# Patient Record
Sex: FEMALE | Race: BLACK OR AFRICAN AMERICAN | NOT HISPANIC OR LATINO | Employment: OTHER | URBAN - METROPOLITAN AREA
[De-identification: names, ages, dates, MRNs, and addresses within clinical notes are randomized per-mention and may not be internally consistent; named-entity substitution may affect disease eponyms.]

---

## 2001-11-29 LAB — BMD RECOMMENDATION EXT: NORMAL

## 2020-10-05 ENCOUNTER — HOSPITAL ENCOUNTER (OUTPATIENT)
Dept: MEDSURG UNIT | Facility: HOSPITAL | Age: 75
End: 2020-10-07
Attending: INTERNAL MEDICINE | Admitting: INTERNAL MEDICINE

## 2020-10-05 LAB
ABS NEUT (OLG): 9.03 X10(3)/MCL (ref 2.1–9.2)
ALBUMIN SERPL-MCNC: 4 GM/DL (ref 3.4–4.8)
ALBUMIN/GLOB SERPL: 0.9 RATIO (ref 1.1–2)
ALP SERPL-CCNC: 130 UNIT/L (ref 40–150)
ALT SERPL-CCNC: 20 UNIT/L (ref 0–55)
AST SERPL-CCNC: 24 UNIT/L (ref 5–34)
BASOPHILS # BLD AUTO: 0.1 X10(3)/MCL (ref 0–0.2)
BASOPHILS NFR BLD AUTO: 1 %
BILIRUB SERPL-MCNC: 0.3 MG/DL
BILIRUBIN DIRECT+TOT PNL SERPL-MCNC: 0.1 MG/DL (ref 0–0.5)
BILIRUBIN DIRECT+TOT PNL SERPL-MCNC: 0.2 MG/DL (ref 0–0.8)
BUN SERPL-MCNC: 8.7 MG/DL (ref 9.8–20.1)
CALCIUM SERPL-MCNC: 8.9 MG/DL (ref 8.4–10.2)
CHLORIDE SERPL-SCNC: 101 MMOL/L (ref 98–107)
CO2 SERPL-SCNC: 28 MMOL/L (ref 23–31)
CREAT SERPL-MCNC: 0.77 MG/DL (ref 0.55–1.02)
EOSINOPHIL # BLD AUTO: 0.2 X10(3)/MCL (ref 0–0.9)
EOSINOPHIL NFR BLD AUTO: 1 %
ERYTHROCYTE [DISTWIDTH] IN BLOOD BY AUTOMATED COUNT: 14.6 % (ref 11.5–17)
GLOBULIN SER-MCNC: 4.3 GM/DL (ref 2.4–3.5)
GLUCOSE SERPL-MCNC: 107 MG/DL (ref 82–115)
HCT VFR BLD AUTO: 40 % (ref 37–47)
HGB BLD-MCNC: 12.3 GM/DL (ref 12–16)
LYMPHOCYTES # BLD AUTO: 1.9 X10(3)/MCL (ref 0.6–4.6)
LYMPHOCYTES NFR BLD AUTO: 16 %
MCH RBC QN AUTO: 27.3 PG (ref 27–31)
MCHC RBC AUTO-ENTMCNC: 30.8 GM/DL (ref 33–36)
MCV RBC AUTO: 88.9 FL (ref 80–94)
MONOCYTES # BLD AUTO: 1.2 X10(3)/MCL (ref 0.1–1.3)
MONOCYTES NFR BLD AUTO: 9 %
NEUTROPHILS # BLD AUTO: 9.03 X10(3)/MCL (ref 2.1–9.2)
NEUTROPHILS NFR BLD AUTO: 73 %
PLATELET # BLD AUTO: 447 X10(3)/MCL (ref 130–400)
PMV BLD AUTO: 8.8 FL (ref 9.4–12.4)
POTASSIUM SERPL-SCNC: 4 MMOL/L (ref 3.5–5.1)
PROT SERPL-MCNC: 8.3 GM/DL (ref 5.8–7.6)
RBC # BLD AUTO: 4.5 X10(6)/MCL (ref 4.2–5.4)
SODIUM SERPL-SCNC: 139 MMOL/L (ref 136–145)
WBC # SPEC AUTO: 12.4 X10(3)/MCL (ref 4.5–11.5)

## 2020-10-06 LAB
ABS NEUT (OLG): 6.81 X10(3)/MCL (ref 2.1–9.2)
BASOPHILS # BLD AUTO: 0 X10(3)/MCL (ref 0–0.2)
BASOPHILS NFR BLD AUTO: 0 %
BUN SERPL-MCNC: 11.9 MG/DL (ref 9.8–20.1)
CALCIUM SERPL-MCNC: 8.5 MG/DL (ref 8.4–10.2)
CHLORIDE SERPL-SCNC: 106 MMOL/L (ref 98–107)
CO2 SERPL-SCNC: 22 MMOL/L (ref 23–31)
CREAT SERPL-MCNC: 0.71 MG/DL (ref 0.55–1.02)
CREAT/UREA NIT SERPL: 17
EOSINOPHIL # BLD AUTO: 0.2 X10(3)/MCL (ref 0–0.9)
EOSINOPHIL NFR BLD AUTO: 2 %
ERYTHROCYTE [DISTWIDTH] IN BLOOD BY AUTOMATED COUNT: 14.6 % (ref 11.5–17)
GLUCOSE SERPL-MCNC: 104 MG/DL (ref 82–115)
HCT VFR BLD AUTO: 36.7 % (ref 37–47)
HGB BLD-MCNC: 11.2 GM/DL (ref 12–16)
LYMPHOCYTES # BLD AUTO: 2 X10(3)/MCL (ref 0.6–4.6)
LYMPHOCYTES NFR BLD AUTO: 20 %
MCH RBC QN AUTO: 27.9 PG (ref 27–31)
MCHC RBC AUTO-ENTMCNC: 30.5 GM/DL (ref 33–36)
MCV RBC AUTO: 91.3 FL (ref 80–94)
MONOCYTES # BLD AUTO: 1.1 X10(3)/MCL (ref 0.1–1.3)
MONOCYTES NFR BLD AUTO: 11 %
NEUTROPHILS # BLD AUTO: 6.81 X10(3)/MCL (ref 2.1–9.2)
NEUTROPHILS NFR BLD AUTO: 66 %
PLATELET # BLD AUTO: 462 X10(3)/MCL (ref 130–400)
PMV BLD AUTO: 9.5 FL (ref 9.4–12.4)
POTASSIUM SERPL-SCNC: 3.8 MMOL/L (ref 3.5–5.1)
RBC # BLD AUTO: 4.02 X10(6)/MCL (ref 4.2–5.4)
SODIUM SERPL-SCNC: 138 MMOL/L (ref 136–145)
WBC # SPEC AUTO: 10.3 X10(3)/MCL (ref 4.5–11.5)

## 2020-10-07 LAB
ABS NEUT (OLG): 3.93 X10(3)/MCL (ref 2.1–9.2)
BASOPHILS # BLD AUTO: 0 X10(3)/MCL (ref 0–0.2)
BASOPHILS NFR BLD AUTO: 0 %
BUN SERPL-MCNC: 12 MG/DL (ref 9.8–20.1)
CALCIUM SERPL-MCNC: 8.1 MG/DL (ref 8.4–10.2)
CHLORIDE SERPL-SCNC: 101 MMOL/L (ref 98–107)
CO2 SERPL-SCNC: 28 MMOL/L (ref 23–31)
CREAT SERPL-MCNC: 0.75 MG/DL (ref 0.55–1.02)
CREAT/UREA NIT SERPL: 16
EOSINOPHIL # BLD AUTO: 0.4 X10(3)/MCL (ref 0–0.9)
EOSINOPHIL NFR BLD AUTO: 5 %
ERYTHROCYTE [DISTWIDTH] IN BLOOD BY AUTOMATED COUNT: 14.6 % (ref 11.5–17)
GLUCOSE SERPL-MCNC: 103 MG/DL (ref 82–115)
HCT VFR BLD AUTO: 34.5 % (ref 37–47)
HGB BLD-MCNC: 10.2 GM/DL (ref 12–16)
LYMPHOCYTES # BLD AUTO: 2.4 X10(3)/MCL (ref 0.6–4.6)
LYMPHOCYTES NFR BLD AUTO: 31 %
MCH RBC QN AUTO: 27.1 PG (ref 27–31)
MCHC RBC AUTO-ENTMCNC: 29.6 GM/DL (ref 33–36)
MCV RBC AUTO: 91.8 FL (ref 80–94)
MONOCYTES # BLD AUTO: 1 X10(3)/MCL (ref 0.1–1.3)
MONOCYTES NFR BLD AUTO: 13 %
NEUTROPHILS # BLD AUTO: 3.93 X10(3)/MCL (ref 2.1–9.2)
NEUTROPHILS NFR BLD AUTO: 50 %
PLATELET # BLD AUTO: 356 X10(3)/MCL (ref 130–400)
PMV BLD AUTO: 8.7 FL (ref 9.4–12.4)
POTASSIUM SERPL-SCNC: 3.7 MMOL/L (ref 3.5–5.1)
RBC # BLD AUTO: 3.76 X10(6)/MCL (ref 4.2–5.4)
SODIUM SERPL-SCNC: 136 MMOL/L (ref 136–145)
WBC # SPEC AUTO: 7.9 X10(3)/MCL (ref 4.5–11.5)

## 2022-04-30 NOTE — DISCHARGE SUMMARY
DISCHARGE DATE:  10/07/2020    FINAL DIAGNOSES:    1. Right breast mass.  2. __________ carcinoma.  3. Hypertension.  4. Hyperlipidemia.  5. Sinusitis.    HOSPITAL COURSE:  The patient is a 75-year-old  female who stated that she has been showing some blood clot and some purulent material from the right breast for almost about 2 weeks, but it appeared that she had the problem before that, but she never basically addressed the problem and, prior to that, the patient basically did not want to have a mammogram.  However, when I saw the patient in my office, I admitted the patient immediately and consulted Surgery __________ who eventually consulted another physician who would deal with the breast.  Apparently, we all agreed that when the breast surgery physician also agreed that the patient should have a mammogram and a biopsy, but apparently the patient did not want to have that here, and she wanted to go to [facility] in [place].  I immediately arranged with the  to get in touch with [facility], and apparently the patient is accepted over there, and she is going to go tomorrow.  I definitely had insisted that the breast surgery physician talk to the patient,  __________, and he did and offered the patient the options in case it does not work out over there so we can follow up on the patient closely.    FINAL DISPOSITION:  We will discharge the patient to home and with the indication to follow her appointment at [facility], and also I would like to have contract with the patient in the next 2 weeks to make sure everything goes well.  __________ to get in touch with my office in 2 weeks.  Discharged in satisfactory physical condition and hopefully everything will work out well for her.        ______________________________  MD EARL Longoria/SK  DD:  10/07/2020  Time:  01:01PM  DT:  10/07/2020  Time:  02:07PM  Job #:  [id number]

## 2022-04-30 NOTE — H&P
CHIEF COMPLAINT:  Right breast mass, abscess, and clot.    HISTORY OF PRESENT ILLNESS:  The patient is a 75-year-old  female who basically came to my office because of right breast pain and complaining of clot and claimed also that there was some purulent material that came out.  At this point, I reviewed the area and thought the patient was in need of admission.  I direct admitted the patient to [facility] and consulted Surgery to __________ the breast.    PAST MEDICAL HISTORY:  Significant for hypertension, hyperlipidemia, sinusitis.    PAST SURGICAL HISTORY:  Hysterectomy, angiogram, left cyst removal of the neck.    FAMILY HISTORY:  Cancer, diabetes, __________, stroke, and hypertension.    SOCIAL HISTORY:  The patient is .  Completed 2-1/2 years of college.  Does not drink or smoke.  Has 3 children.  No history of IV drug abuse.    ALLERGIES:  She is allergic to cortisone, penicillin, sulfa.    MEDICATIONS:  The patient takes:  1. Lotrel 10/20 mg daily.  2. Aspirin 81 mg.  3. Occasionally takes tramadol 50 mg b.i.d.  4. Prilosec 40 mg once a day.    REVIEW OF SYSTEMS:  CARDIOVASCULAR:  Negative for chest pain.   RESPIRATORY:  No shortness of breath or productive cough.   GASTROINTESTINAL:  No diarrhea or vomiting.   ENDOCRINOLOGIC:  __________.   NEUROLOGIC:  No neurological deficit.     PSYCHIATRIC:  Negative.    PHYSICAL EXAMINATION:  VITAL SIGNS:  At the time I am seeing the patient, she has a blood pressure of 153/76, a pulse 77, respirations 18, temperature 36.6.   HEAD:  Normocephalic.  No acute trauma to the head.   EYES:  Both eyes are equal and reactive to light, and accommodation is fairly satisfactory.   EARS, NOSE, AND THROAT:  No current pathology.   NECK:  Supple.  No JVD or adenopathy.   HEART:  The patient has S1, S2.  No murmur or gallop.   CHEST:  Clear.  No wheezing or rales.  However, the patient has a right breast mass and apparently some  oozing from the breast and clot.   ABDOMEN:  Soft, nontender.  Good bowel sounds.   EXTREMITIES:  Superior and inferior, good range of motion.   NEUROLOGIC:  No neurological deficit.  Cranial nerves 2 through 12 appear to be intact.    LABORATORY DATA:  The patient has an elevated white blood cell count of 12.4.  BUN 8.7, creatinine 0.77.  Hemoglobin 12.3, hematocrit 40.    IMPRESSION:  Right breast abscess/right breast mass.    PLAN:  To consult Surgery to see what the next step is for this patient.        ______________________________  MD EARL Longoria/SK  DD:  10/06/2020  Time:  02:33PM  DT:  10/06/2020  Time:  03:14PM  Job #:  [id number]    The H&P was reviewed, the patient was examined, and the following changes to the patients condition are noted:  ______________________________________________________________________________  ______________________________________________________________________________  ______________________________________________________________________________  [  ] No changes to the patient's condition:      ______________________________                                             ___________________  PHYSICIAN SIGNATURE                                                             DATE/TIME

## 2022-05-05 NOTE — HISTORICAL [facility] CERNER
This is a historical note converted from Cerjade. Formatting and pictures may have been removed.  Please reference Kaht for original formatting and attached multimedia. Chief Complaint  direct admit for Dr. Pickering for right breast abscess  Reason for Consultation  fungating mass that is likely cancerous  History of Present Illness  Ms Milla Rider is a 74 y/o female patient who was admitted for a right breast mass with ulceration and drainage. She first noticed a skin lesion in the 2:00 area about a year ago. The area gradually enlarged and spread laterally?since she first noticed it. She said a few months ago it began to bruise, and then it ulcerated about 2 weeks ago. She was dressing the area with gauze but decided to?see her PCP yesterday when it began profusely bleeding. After seeing him, he directly admitted her to [facility] and surgery was consulted. The right breast is very tender to touch, especially in the ulceration. She denies any other associated symptoms such as fever, chills, weight loss, or masses in the other breast.  ?   She has not had any?recent mammograms. Has had at least one before but not sure how long ago.  She denies family history of breast cancer. Her mother had lung cancer as well as her grandfather. Denies family history of colon, ovarian, pancreatic, or stomach cancer.  Review of Systems  Constitutional: denies fevers, chills, weight loss  HEENT: denies blurry/double vision, changes in hearing, odynophagia, dysphagia  Respiratory: denies cough, shortness of breath  Cardiovascular: denies palpitations, swelling of the extremities  GI: denies abdominal pain, nausea/vomiting, hematochezia, frequent stools  : denies frequency, dysuria, flank pain, hematuria  Skin: denies new rashes  Neurological: denies muscular/sensory deficiencies, loss of coordination, headaches, memory changes  Endo: denies hair loss/thinning, nervousness, hot flashes, heat/cold intolerance, lumps in the neck  area  Heme: denies easy bruising and fatigue  Psychological: denies anxious/depressive moods  Musculoskeletal: denies bony pain, muscle cramps, swollen joints  Physical Exam  Vitals & Measurements  T:?36.7? ?C (Oral)? TMIN:?36.6? ?C (Oral)? TMAX:?37? ?C (Oral)? HR:?75(Peripheral)? RR:?20? BP:?155/72? SpO2:?95%? WT:?87?kg? BMI:?30.1?  General: The patient is awake, alert and oriented times three. The patient is well nourished and in no acute distress.  Neck: There is no evidence of palpable cervical, supraclavicular or axillary adenopathy. The neck is supple. The thyroid is not enlarged.  Musculoskeletal: The patient has a normal range of motion of her bilateral upper extremities.  Chest: Examination of the chest wall fails to reveal any obvious abnormalities.? The lungs are clear to auscultation bilaterally without rales, rhonchi, or wheezing.  Cardiovascular: The heart has a regular rate and rhythm without murmurs, gallops or rubs.  Breast: Examination of right breast?reveals?~3cm?necrotic ulceration with?dark?brown, bloody/clot-filled?exudate in the 1:00 to 2:00 axis. Ulceration is tender to touch.?There is an associated palpable mass?below the ulceration that reaches to the lateral side of the breast to about the?10:00 axis.?The mass has ecchymosis with telangiectasias present .?Examination of the left breast fails to reveal any dominant masses or areas of significant focal nodularity. The nipples are everted bilaterally without evidence of discharge. The nipple/areolar complexes are normal bilaterally. There are no significant skin changes overlying the breasts. There is no skin dimpling with movement of the pectoralis.  Abdomen: The abdomen is soft, flat, nontender and nondistended with no palpable masses or organomegaly.  Integumentary: no rashes or skin lesions present  Neurologic: cranial nerves intact, no signs of peripheral neurological deficit, motor/sensory function intact  Assessment/Plan  Direct  admit?[id number]  Ms Milla Rider is a 76 y/o female patient who was admitted to [facility] for a right breast mass with ulceration and drainage. It is very tender to touch, especially in the ulceration. She denies any other associated symptoms such as fever, chills, weight loss, or masses in the other breast. Examination of right breast?reveals?~3cm?necrotic ulceration with?dark?brown, bloody/clot-filled?exudate in the 1:00 to 2:00 axis. .?There is an associated palpable mass?below the ulceration that reaches to the lateral side of the breast to about the?10:00 axis.?The mass has ecchymosis with telangiectasias present. The contralateral breast exam reveals no dominant masses.  ?   The right breast mass and ulceration?are highly suspicious of malignancy.  ?   PLAN:  1. Recommend discharge with short term follow up at [facility].  2. B/L DG MG and R breast/axilla US tomorrow at 1:00 at [facility].  3. Punch biopsy recommended, will perform in clinic either tomorrow or Thursday?after discharge.  ?   Face to face time spent with the patient?exceed 55 minutes?with over 50% of the visit being used for education and counseling regarding medical conditions, imaging, pathology, and treatment recommendations including risk/benefits and side effects/adverse events?of medications, procedures and?other non-surgical treatments. The patient is receptive, expresses understanding and is agreeable to the plan. All questions were answered.  ?   Asha Lopes PA-C  ?   Problem List/Past Medical History  Ongoing  Obesity  Historical  Able to lie down  Acid reflux  Carotid artery disease  ET - Exercise tolerance  Exposure to TB  Hypertension  Osteoarthritis  Seasonal allergies  Sinus infection  Procedure/Surgical History  52206 - LT CATARACT W/IOL 1 STA PHACO (Left) (12/03/2015)  Extracapsular cataract removal with insertion of intraocular lens prosthesis (1 stage procedure), manual or mechanical  technique (eg, irrigation and aspiration or phacoemulsification) (12/03/2015)  Replacement of Left Lens with Synthetic Substitute, Percutaneous Approach (12/03/2015)  52372 - RT CATARACT W/IOL 1 STA PHACO (Right) (09/21/2015)  Extracapsular cataract removal with insertion of intraocular lens prosthesis (1 stage procedure), manual or mechanical technique (eg, irrigation and aspiration or phacoemulsification) (09/21/2015)  Insertion of intraocular lens prosthesis at time of cataract extraction, one-stage (09/21/2015)  Phacoemulsification and aspiration of cataract (09/21/2015)  Colonoscopy  Cyst  Dilatation and curettage  Hysterectomy  Partial hysterectomy   Medications  Inpatient  IVF D5 ? Normal Saline Infusion 1,000 mL, 1000 mL, IV  Norco 7.5 mg-325 mg oral tablet, 1 tab(s), Oral, q6hr, PRN  pantoprazole, 40 mg= 1 tab(s), Oral, Daily  Home  aspirin 81 mg oral tablet, 81 mg= 1 tab(s), Oral, Daily  Flonase 50 mcg/inh nasal spray, 1 spray(s), Nasal, Once  Lotrel 10 mg-20 mg oral capsule, 1 cap(s), Oral, Daily  Mylanta Gas Minis Mint, 125 mg, Chewed, QIDPCHS, PRN  PriLOSEC OTC  traMADol 50 mg oral tablet, 50 mg= 1 tab(s), Oral, q4hr, PRN  Allergies  Allergy to sulfa drugs?(heartburn, nausea)  H/O: penicillin allergy?(hives)  cortisone?(hypertension)  Social History  Abuse/Neglect  No, No, Yes, 10/06/2020  No, No, Yes, 10/05/2020  Alcohol - Denies Alcohol Use, 09/16/2015  Never, 10/05/2020  Employment/School  Employed, 09/16/2015  Substance Use - Denies Substance Abuse, 09/16/2015  Never, 10/05/2020  Tobacco - Denies Tobacco Use, 09/16/2015  Never (less than 100 in lifetime), No, 10/06/2020  Never (less than 100 in lifetime), No, 10/05/2020  Family History  Family history is unknown  Lab Results  Labs Last 24 Hours?  ?Chemistry? Hematology/Coagulation?   Sodium Lvl: 138 mmol/L (10/06/20 05:42:00) WBC: 10.3 x10(3)/mcL (10/06/20 05:42:00)   Potassium Lvl: 3.8 mmol/L (10/06/20 05:42:00) RBC:?4.02 x10(6)/mcL?Low (10/06/20  05:42:00)   Chloride: 106 mmol/L (10/06/20 05:42:00) Hgb:?11.2 gm/dL?Low (10/06/20 05:42:00)   CO2:?22 mmol/L?Low (10/06/20 05:42:00) Hct:?36.7 %?Low (10/06/20 05:42:00)   Calcium Lvl: 8.5 mg/dL (10/06/20 05:42:00) Platelet:?462 x10(3)/mcL?High (10/06/20 05:42:00)   Glucose Lvl: 104 mg/dL (10/06/20 05:42:00) MCV: 91.3 fL (10/06/20 05:42:00)   BUN: 11.9 mg/dL (10/06/20 05:42:00) MCH: 27.9 pg (10/06/20 05:42:00)   Creatinine: 0.71 mg/dL (10/06/20 05:42:00) MCHC:?30.5 gm/dL?Low (10/06/20 05:42:00)   BUN/Creat Ratio: 17 (10/06/20 05:42:00) RDW: 14.6 % (10/06/20 05:42:00)   eGFR-AA: >60 (10/06/20 05:42:00) MPV: 9.5 fL (10/06/20 05:42:00)   eGFR-CYNTHIA: >60 (10/06/20 05:42:00) Abs Neut: 6.81 x10(3)/mcL (10/06/20 05:42:00)   Bili Total: 0.3 mg/dL (10/05/20 18:02:00) Neutro Auto: 66 % (10/06/20 05:42:00)   Bili Direct: 0.1 mg/dL (10/05/20 18:02:00) Lymph Auto: 20 % (10/06/20 05:42:00)   Bili Indirect: 0.2 mg/dL (10/05/20 18:02:00) Mono Auto: 11 % (10/06/20 05:42:00)   AST: 24 unit/L (10/05/20 18:02:00) Eos Auto: 2 % (10/06/20 05:42:00)   ALT: 20 unit/L (10/05/20 18:02:00) Abs Eos: 0.2 x10(3)/mcL (10/06/20 05:42:00)   Alk Phos: 130 unit/L (10/05/20 18:02:00) Basophil Auto: 0 % (10/06/20 05:42:00)   Total Protein:?8.3 gm/dL?High (10/05/20 18:02:00) Abs Neutro: 6.81 x10(3)/mcL (10/06/20 05:42:00)   Albumin Lvl: 4 gm/dL (10/05/20 18:02:00) Abs Lymph: 2 x10(3)/mcL (10/06/20 05:42:00)   Globulin:?4.3 gm/dL?High (10/05/20 18:02:00) Abs Mono: 1.1 x10(3)/mcL (10/06/20 05:42:00)   A/G Ratio:?0.9 ratio?Low (10/05/20 18:02:00) Abs Baso: 0 x10(3)/mcL (10/06/20 05:42:00)   Diagnostic Results  None      Patient was seen and examined with the PA. I discussed with the patient?her clinical examination and high suspicion?for?breast malignancy. We discussed that she would need formal breast imaging which we had set up for her on 10/7/20, but the patient had canceled the appointment because she is considering going to [facility]. I discussed with  her and her daughter, who was on the phone, the potential options of biopsy tomorrow morning to establish diagnosis without formal imaging, formal imaging and biopsy tomorrow, or travel to [facility]?without anything (which is not my recommended approach). The patient and daughter wanted to discuss the options further.? The daughter stated that she spoke with someone at [facility] who said she could try a direct transfer or?go to the ED at [facility] to get seen the soonest. I am not sure how reliable this is. This would have to organized by Dr. Shen. We will see her tomorrow morning to determine what course of action she is wanting to take.  ?  During her visit, I dressed her open wound with Adaptic (Vaseline) dressing to help keep the blood clot from being removed by the gauze. We are also trying to avoid tape to her skin. The breast mass and ulcer are not infected and there is no evidence of abscess. Elevated WBC is likely related to the fact she has dead cells present secondary to what I suspect is cancer cell death. Her WBC is now within normal limits.  ?  Thank for allowing the opportunity to consult and will continue to follow.  ?  Tiffany Knapp MD  Breast Surgical Oncologist   Correction: Dr. Pickering (not Ac)

## 2022-05-05 NOTE — HISTORICAL [facility] CERNER
This is a historical note converted from Kath. Formatting and pictures may have been removed.  Please reference Kath for original formatting and attached multimedia. Chief Complaint  direct admit for Dr. Pickering for right breast abscess  History of Present Illness  74yo F with PMH HTN presents to the ED as a direct admit from her PCPs office for right breast mass with superficial ulceration and drainage. She reports a ~1yr hx of skin changes to this right breast; she reports that 2 weeks ago a small hole popped up in the middle of the skin changes and afterward the hole began to get larger, until it was about the size of a silver dollar. A few days ago, the hole completely opened up and started bleeding briskly at home. She was able to stop the bleeding with holding pressure at home; at that time she got in touch with her PCP who scheduled her for an appt; she saw him today and is now a direct admit to [facility] for this problem. Surgery has been consulted to evaluate the breast abscess vs. lesion.  ?  The patient denies recent mammogram but has had one a long time ago, year unknown. No personal or family hx of breast cancer though she does have a sister who has hx of breast abscess/cysts. No family/personal hx of colon, ovarian, uterine, or any other types of cancer that she knows of. Surgical hx notable for hysterectomy. No notable abnormalities in the contralateral breast.  Review of Systems  Denies recent unintentional weight loss, fever/chills, night sweats, or any other B symptoms. No nipple discharge, just reports discharge from the ulcerative lesion itself.  Physical Exam  Vitals & Measurements  T:?37? ?C (Oral)? HR:?97(Peripheral)? RR:?20? BP:?151/80? SpO2:?98%? WT:?87?kg?  GEN: NAD, awake/alert, interactive  NEURO: CNs grossly intact  HEENT: NCAT, EOMI, MMM  CV: regular rate  PULM: normal WOB, equal chest rise  ABD: soft, NT, ND  EXT: warm, well-perfused, no cyanosis/edema  BREAST:?large, hypervascular  right-sided breast mass occupying the 12-2 oclock position. Overlying skin changes with medial exophytic, ulcerative, fungating lesion ~2-3cm in diameter. no active bleeding or drainage. Area is appropriately TTP. No notable L-sided breast lesions/masses/skin changes.  Assessment/Plan  Direct admit?[id number]  76yo F direct admitted from her PCPs office for R breast abscess found to have an exophytic, fungating R breast mass.  -recommend consultation to the breast surgery service, Dr. Knapp, for fungating mass that is likely cancerous  -no indication for incision/drainage from general surgery perspective  -will defer remainder of care to primary team and breast surgery, but please reach out with any future questions/concerns  ?  Plan of care discussed with attending surgeon on call, Dr. Hein.  ?  Andrés Frias MD  [employer] General Surgery PGY-2   Problem List/Past Medical History  Ongoing  No qualifying data  Historical  Able to lie down  Acid reflux  Carotid artery disease  ET - Exercise tolerance  Exposure to TB  Hypertension  Osteoarthritis  Seasonal allergies  Sinus infection  Procedure/Surgical History  76443 - LT CATARACT W/IOL 1 STA PHACO (Left) (12/03/2015)  Extracapsular cataract removal with insertion of intraocular lens prosthesis (1 stage procedure), manual or mechanical technique (eg, irrigation and aspiration or phacoemulsification) (12/03/2015)  Replacement of Left Lens with Synthetic Substitute, Percutaneous Approach (12/03/2015)  04215 - RT CATARACT W/IOL 1 STA PHACO (Right) (09/21/2015)  Extracapsular cataract removal with insertion of intraocular lens prosthesis (1 stage procedure), manual or mechanical technique (eg, irrigation and aspiration or phacoemulsification) (09/21/2015)  Insertion of intraocular lens prosthesis at time of cataract extraction, one-stage (09/21/2015)  Phacoemulsification and aspiration of cataract (09/21/2015)  Colonoscopy  Cyst  Dilatation and  curettage  Hysterectomy  Partial hysterectomy   Medications  Inpatient  IVF D5 ? Normal Saline Infusion 1,000 mL, 1000 mL, IV  Norco 7.5 mg-325 mg oral tablet, 1 tab(s), Oral, q6hr, PRN  Home  aspirin 81 mg oral tablet, 81 mg= 1 tab(s), Oral, Daily  Lotrel 10 mg-20 mg oral capsule, 1 cap(s), Oral, Daily  PriLOSEC OTC  traMADol 50 mg oral tablet, 50 mg= 1 tab(s), Oral, q4hr, PRN  Allergies  Allergy to sulfa drugs?(heartburn, nausea)  H/O: penicillin allergy?(hives)  cortisone?(hypertension)  Social History  Alcohol - Denies Alcohol Use, 09/16/2015  Employment/School  Employed, 09/16/2015  Substance Use - Denies Substance Abuse, 09/16/2015  Tobacco - Denies Tobacco Use, 09/16/2015  Family History  Family history is unknown  No family or personal hx of breast, ovarian, uterine, colon cancer  Lab Results  Pending      agree with above assessment and plan  will need breast surgeon consult for surgical management

## 2022-06-09 ENCOUNTER — OFFICE VISIT (OUTPATIENT)
Dept: FAMILY MEDICINE | Facility: CLINIC | Age: 77
End: 2022-06-09
Payer: MEDICARE

## 2022-06-09 VITALS
SYSTOLIC BLOOD PRESSURE: 144 MMHG | DIASTOLIC BLOOD PRESSURE: 79 MMHG | HEART RATE: 71 BPM | RESPIRATION RATE: 18 BRPM | WEIGHT: 167 LBS | TEMPERATURE: 99 F | OXYGEN SATURATION: 99 %

## 2022-06-09 DIAGNOSIS — G47.00 INSOMNIA, UNSPECIFIED TYPE: ICD-10-CM

## 2022-06-09 DIAGNOSIS — I10 PRIMARY HYPERTENSION: Primary | ICD-10-CM

## 2022-06-09 DIAGNOSIS — M81.0 OSTEOPOROSIS, UNSPECIFIED OSTEOPOROSIS TYPE, UNSPECIFIED PATHOLOGICAL FRACTURE PRESENCE: ICD-10-CM

## 2022-06-09 DIAGNOSIS — C50.911 MUCINOUS CARCINOMA OF RIGHT BREAST: ICD-10-CM

## 2022-06-09 DIAGNOSIS — R74.8 ELEVATED ALKALINE PHOSPHATASE LEVEL: ICD-10-CM

## 2022-06-09 DIAGNOSIS — F41.9 ANXIETY: ICD-10-CM

## 2022-06-09 DIAGNOSIS — L29.9 PRURITUS: ICD-10-CM

## 2022-06-09 DIAGNOSIS — D50.9 IRON DEFICIENCY ANEMIA, UNSPECIFIED IRON DEFICIENCY ANEMIA TYPE: ICD-10-CM

## 2022-06-09 PROBLEM — C50.919: Status: ACTIVE | Noted: 2022-06-09

## 2022-06-09 PROCEDURE — 3078F DIAST BP <80 MM HG: CPT | Mod: CPTII,,, | Performed by: STUDENT IN AN ORGANIZED HEALTH CARE EDUCATION/TRAINING PROGRAM

## 2022-06-09 PROCEDURE — 1125F PR PAIN SEVERITY QUANTIFIED, PAIN PRESENT: ICD-10-PCS | Mod: CPTII,,, | Performed by: STUDENT IN AN ORGANIZED HEALTH CARE EDUCATION/TRAINING PROGRAM

## 2022-06-09 PROCEDURE — 99214 PR OFFICE/OUTPT VISIT, EST, LEVL IV, 30-39 MIN: ICD-10-PCS | Mod: ,,, | Performed by: STUDENT IN AN ORGANIZED HEALTH CARE EDUCATION/TRAINING PROGRAM

## 2022-06-09 PROCEDURE — 1159F PR MEDICATION LIST DOCUMENTED IN MEDICAL RECORD: ICD-10-PCS | Mod: CPTII,,, | Performed by: STUDENT IN AN ORGANIZED HEALTH CARE EDUCATION/TRAINING PROGRAM

## 2022-06-09 PROCEDURE — 1101F PR PT FALLS ASSESS DOC 0-1 FALLS W/OUT INJ PAST YR: ICD-10-PCS | Mod: CPTII,,, | Performed by: STUDENT IN AN ORGANIZED HEALTH CARE EDUCATION/TRAINING PROGRAM

## 2022-06-09 PROCEDURE — 1159F MED LIST DOCD IN RCRD: CPT | Mod: CPTII,,, | Performed by: STUDENT IN AN ORGANIZED HEALTH CARE EDUCATION/TRAINING PROGRAM

## 2022-06-09 PROCEDURE — 1125F AMNT PAIN NOTED PAIN PRSNT: CPT | Mod: CPTII,,, | Performed by: STUDENT IN AN ORGANIZED HEALTH CARE EDUCATION/TRAINING PROGRAM

## 2022-06-09 PROCEDURE — 3078F PR MOST RECENT DIASTOLIC BLOOD PRESSURE < 80 MM HG: ICD-10-PCS | Mod: CPTII,,, | Performed by: STUDENT IN AN ORGANIZED HEALTH CARE EDUCATION/TRAINING PROGRAM

## 2022-06-09 PROCEDURE — 3288F PR FALLS RISK ASSESSMENT DOCUMENTED: ICD-10-PCS | Mod: CPTII,,, | Performed by: STUDENT IN AN ORGANIZED HEALTH CARE EDUCATION/TRAINING PROGRAM

## 2022-06-09 PROCEDURE — 3077F SYST BP >= 140 MM HG: CPT | Mod: CPTII,,, | Performed by: STUDENT IN AN ORGANIZED HEALTH CARE EDUCATION/TRAINING PROGRAM

## 2022-06-09 PROCEDURE — 3077F PR MOST RECENT SYSTOLIC BLOOD PRESSURE >= 140 MM HG: ICD-10-PCS | Mod: CPTII,,, | Performed by: STUDENT IN AN ORGANIZED HEALTH CARE EDUCATION/TRAINING PROGRAM

## 2022-06-09 PROCEDURE — 99214 OFFICE O/P EST MOD 30 MIN: CPT | Mod: ,,, | Performed by: STUDENT IN AN ORGANIZED HEALTH CARE EDUCATION/TRAINING PROGRAM

## 2022-06-09 PROCEDURE — 1101F PT FALLS ASSESS-DOCD LE1/YR: CPT | Mod: CPTII,,, | Performed by: STUDENT IN AN ORGANIZED HEALTH CARE EDUCATION/TRAINING PROGRAM

## 2022-06-09 PROCEDURE — 3288F FALL RISK ASSESSMENT DOCD: CPT | Mod: CPTII,,, | Performed by: STUDENT IN AN ORGANIZED HEALTH CARE EDUCATION/TRAINING PROGRAM

## 2022-06-09 RX ORDER — FERROUS SULFATE 325(65) MG
325 TABLET ORAL
COMMUNITY
Start: 2022-01-11 | End: 2023-10-12

## 2022-06-09 RX ORDER — ZOLPIDEM TARTRATE 5 MG/1
5 TABLET ORAL
COMMUNITY
Start: 2021-12-29 | End: 2022-06-09

## 2022-06-09 RX ORDER — AMLODIPINE AND BENAZEPRIL HYDROCHLORIDE 10; 20 MG/1; MG/1
1 CAPSULE ORAL DAILY
COMMUNITY
Start: 2022-05-27 | End: 2023-02-24 | Stop reason: SDUPTHER

## 2022-06-09 RX ORDER — HYDROXYZINE HYDROCHLORIDE 25 MG/1
25 TABLET, FILM COATED ORAL NIGHTLY
Qty: 60 TABLET | Refills: 0 | Status: SHIPPED | OUTPATIENT
Start: 2022-06-09 | End: 2022-07-25

## 2022-06-09 RX ORDER — PRENATAL VIT CALC,IRON,FOLIC
1 TABLET ORAL DAILY
COMMUNITY

## 2022-06-09 RX ORDER — ANASTROZOLE 1 MG/1
1 TABLET ORAL DAILY
COMMUNITY
Start: 2022-04-21

## 2022-06-09 RX ORDER — ASPIRIN 81 MG/1
81 TABLET ORAL
COMMUNITY
End: 2023-08-17 | Stop reason: ALTCHOICE

## 2022-06-09 RX ORDER — CHOLECALCIFEROL (VITAMIN D3) 1250 MCG
50000 TABLET ORAL
COMMUNITY
Start: 2021-12-29

## 2022-06-09 NOTE — ASSESSMENT & PLAN NOTE
- She takes melatonin PRN, but continues to have a hard time sleeping.   - She had taken a temporary prescription of Ambien recently and this helped.  - Starting Vistaril 25mg nightly. Patient instructed she can take an additional tablet to equal 50mg nightly.  - Re-evaluation in 1 month.

## 2022-06-09 NOTE — ASSESSMENT & PLAN NOTE
- Patient following with [facility] in [place], [place]. Patient following with Dr. Long Mathis with Oncology. She sees Oncology every 6 months.  - She was diagnosed in October 2020.  - Patient had right breast mastectomy and 43 lymph nodes removed surrounding right breast on 11/16/20.  - Patient is taking anastrazole for a total of 5 years. Anastrazole per Oncology.

## 2022-06-09 NOTE — ASSESSMENT & PLAN NOTE
- Patient following with Dr. Tiffanie Piper with Endocrinology in [place], [place].  - Patient taking vitamin D + calcium supplementation daily.

## 2022-06-09 NOTE — ASSESSMENT & PLAN NOTE
- Patient following with Dr. Geovanna Soliz with Hematology in [place], [place]. She sees Hematology every 6 months.  - She is taking iron supplementation every other day.

## 2022-06-09 NOTE — PROGRESS NOTES
Subjective:      Patient ID: Milla Rider is a 77 y.o.  female.    Chief Complaint: Chronic Medical Management    Mucinous Carcinoma of Right Breast: Patient following with [facility] in [place], [place]. Patient following with Dr. Long Mathis with Oncology. She sees Oncology every 6 months. She was diagnosed in October 2020. Patient had right breast mastectomy and 43 lymph nodes removed surrounding right breast on 11/16/20. Patient is taking anastrazole for a total of 5 years.     KAREN: Patient following with Dr. Geovanna Soliz with Hematology in [place], [place]. She sees Hematology every 6 months. She is taking iron supplementation every other day.    HTN: /82. Patient states compliance with Lotrel. She states she did not sleep well last evening and drank coffee this morning.    Insomnia/Anxiety/Pruritis: She takes melatonin PRN, but continues to have a hard time sleeping. She had taken a temporary prescription of Ambien recently and this helped. She's not taken Vistaril before.    Osteoporosis: Patient following with Dr. Tiffanie Piper with Endocrinology in [place], [place]. Patient taking vitamin D + calcium supplementation.    Preventative Health: Patient had a hysterectomy unrelated to CA.    Review of Systems   Constitutional: Negative for activity change, fatigue and fever.   Respiratory: Negative for apnea, cough and shortness of breath.    Cardiovascular: Negative for chest pain and leg swelling.   Gastrointestinal: Positive for constipation. Negative for abdominal pain, diarrhea and nausea.   Musculoskeletal: Negative for arthralgias, back pain and myalgias.   Skin: Negative for rash and wound.   Psychiatric/Behavioral: Positive for sleep disturbance. Negative for behavioral problems. The patient is nervous/anxious.      Objective:   BP (!) 144/79   Pulse 71   Temp 98.6 °F (37 °C) (Oral)   Resp 18   Wt 75.8 kg (167 lb)   SpO2 99%     Physical Exam  Vitals and nursing note reviewed.    Constitutional:       General: She is not in acute distress.     Appearance: Normal appearance. She is not ill-appearing or toxic-appearing.   HENT:      Head: Normocephalic and atraumatic.      Mouth/Throat:      Mouth: Mucous membranes are moist.      Pharynx: Oropharynx is clear.   Cardiovascular:      Rate and Rhythm: Normal rate and regular rhythm.      Heart sounds: Normal heart sounds. No murmur heard.  Pulmonary:      Effort: Pulmonary effort is normal. No respiratory distress.      Breath sounds: Normal breath sounds.   Abdominal:      General: There is no distension.      Palpations: Abdomen is soft.      Tenderness: There is no abdominal tenderness.   Musculoskeletal:         General: Normal range of motion.      Cervical back: Normal range of motion and neck supple. No tenderness.      Right lower leg: No edema.      Left lower leg: No edema.   Lymphadenopathy:      Cervical: No cervical adenopathy.   Skin:     General: Skin is warm and dry.      Findings: No lesion or rash.   Neurological:      General: No focal deficit present.      Mental Status: She is alert. Mental status is at baseline.   Psychiatric:         Mood and Affect: Mood normal.         Behavior: Behavior normal.         Thought Content: Thought content normal.         Judgment: Judgment normal.       Assessment:     1. Primary hypertension    2. Mucinous carcinoma of right breast    3. Iron deficiency anemia, unspecified iron deficiency anemia type    4. Insomnia, unspecified type    5. Anxiety    6. Pruritus    7. Osteoporosis, unspecified osteoporosis type, unspecified pathological fracture presence    8. Elevated alkaline phosphatase level      Plan:     Problem List Items Addressed This Visit        Psychiatric    Anxiety     - Vistaril nightly.           Relevant Medications    hydrOXYzine HCL (ATARAX) 25 MG tablet       Derm    Pruritus     - Vistaril nightly.              Cardiac/Vascular    Hypertension - Primary     - BP  167/82    - Repeat /79.  - Continue Amlodipine-Benazepril 10mg-20mg daily.             Relevant Orders    Comprehensive Metabolic Panel       Oncology    Iron deficiency anemia     - Patient following with Dr. Geovanna Soliz with Hematology in [place], [place]. She sees Hematology every 6 months.  - She is taking iron supplementation every other day.           Mucinous carcinoma of breast     - Patient following with [facility] in [place], [place]. Patient following with Dr. Long Mathis with Oncology. She sees Oncology every 6 months.  - She was diagnosed in October 2020.  - Patient had right breast mastectomy and 43 lymph nodes removed surrounding right breast on 11/16/20.  - Patient is taking anastrazole for a total of 5 years. Anastrazole per Oncology.                Orthopedic    Osteoporosis     - Patient following with Dr. Tiffanie Piper with Endocrinology in [place], [place].  - Patient taking vitamin D + calcium supplementation daily.                Other    Insomnia     - She takes melatonin PRN, but continues to have a hard time sleeping.   - She had taken a temporary prescription of Ambien recently and this helped.  - Starting Vistaril 25mg nightly. Patient instructed she can take an additional tablet to equal 50mg nightly.  - Re-evaluation in 1 month.           Relevant Medications    hydrOXYzine HCL (ATARAX) 25 MG tablet      Other Visit Diagnoses     Elevated alkaline phosphatase level        - Repeat CMP for re-evaluation.    Relevant Orders    Comprehensive Metabolic Panel

## 2022-07-06 ENCOUNTER — TELEPHONE (OUTPATIENT)
Dept: ADMINISTRATIVE | Facility: HOSPITAL | Age: 77
End: 2022-07-06
Payer: MEDICARE

## 2022-07-06 DIAGNOSIS — M19.90 OSTEOARTHRITIS, UNSPECIFIED OSTEOARTHRITIS TYPE, UNSPECIFIED SITE: Primary | ICD-10-CM

## 2022-07-06 NOTE — TELEPHONE ENCOUNTER
Type:  Needs Medical Advice    Who Called: self  Symptoms (please be specific): pain   How long has patient had these symptoms:  A while  Pharmacy name and phone #:  [facility]  Would the patient rather a call back or a response via [facility]? Call back  Best Call Back Number: [phone]  Additional Information: pt stated that she taking medication from chemo medication she cant walk or raise her arms. Pt requesting a medication to help please advise

## 2022-07-07 NOTE — TELEPHONE ENCOUNTER
Spoke to pt. She states that she is having an arthritic flare up. She states that this is caused from her chemo drugs. She is taking Ibuprofen OTC but states that it provides little relief. She is asking if something else can be prescribed. She is having bilat shoulder and hip pain. Please advise.

## 2022-07-08 RX ORDER — MELOXICAM 7.5 MG/1
7.5 TABLET ORAL DAILY PRN
Qty: 90 TABLET | Refills: 0 | Status: SHIPPED | OUTPATIENT
Start: 2022-07-08 | End: 2022-07-25

## 2022-07-08 NOTE — TELEPHONE ENCOUNTER
Will prescribe Mobic 7.5mg daily PRN. Patient cannot take any other NSAIDs with Mobic, as this is an NSAID as well. Please instruct patient to take Mobic with food to decrease GI upset.

## 2022-07-18 ENCOUNTER — TELEPHONE (OUTPATIENT)
Dept: FAMILY MEDICINE | Facility: CLINIC | Age: 77
End: 2022-07-18
Payer: MEDICARE

## 2022-07-18 NOTE — TELEPHONE ENCOUNTER
----- Message from Anthony Meng sent at 7/18/2022  9:51 AM CDT -----  .Type:  Needs Medical Advice    Who Called: Milla  Symptoms (please be specific):    How long has patient had these symptoms:    Pharmacy name and phone #:    Would the patient rather a call back or a response via [facility]?   Best Call Back Number: [phone]  Additional Information: Speak with nurse about covid she tested positive. She would like to know if she has any instructions for her.

## 2022-07-19 NOTE — TELEPHONE ENCOUNTER
Pt called stating that she was seen at urgent care and was given Doxycycline, Meclizine, Albuterol, Tessalon Perles, and Tylenol for fever. She just wanted to make sure that she could take these meds. I advised pt that these were okay per Dr Oliveira.

## 2022-07-20 ENCOUNTER — TELEPHONE (OUTPATIENT)
Dept: FAMILY MEDICINE | Facility: CLINIC | Age: 77
End: 2022-07-20
Payer: MEDICARE

## 2022-07-20 NOTE — TELEPHONE ENCOUNTER
----- Message from Jenny Harry sent at 7/20/2022  9:21 AM CDT -----  Regarding: advice  Type:  Needs Medical Advice    Who Called: pt   Symptoms (please be specific): pt test positive for covid saturday body aches   How long has patient had these symptoms:  2 1/2 weeks  Pharmacy name and phone #:  [facility] [address]  Would the patient rather a call back or a response via [facility]? C/b  Best Call Back Number: [phone]  Additional Information: pt feels body aches may not be from arthritis but may be from covid pt wants to know if she should still take the meloxicam.  Pts body aches are still very bad, neck, hips, back, shoulders are in pain.

## 2022-07-20 NOTE — TELEPHONE ENCOUNTER
If body aches are from COVID, the illness has to run its course. She will need to continue symptomatic treatment with hydration and anti-inflammatories like her Mobic.

## 2022-08-01 ENCOUNTER — TELEPHONE (OUTPATIENT)
Dept: FAMILY MEDICINE | Facility: CLINIC | Age: 77
End: 2022-08-01
Payer: MEDICARE

## 2022-08-01 NOTE — TELEPHONE ENCOUNTER
----- Message from Zayda Pina sent at 8/1/2022  8:32 AM CDT -----  Regarding: med adv  Type:  Needs Medical Advice    Who Called: patient  Symptoms (please be specific): severe body aches and was positive for Covid on 07/16/22  How long has patient had these symptoms:  two weeks  [facility] [address]  Would the patient rather a call back or a response via [facility]?   Best Call Back Number: [phone]  Additional Information: Patient feels as though she has not recovered from Covid. Please call patient.

## 2022-08-01 NOTE — TELEPHONE ENCOUNTER
Spoke to pt. She states that she feels as though she has not recovered from Covid. She states that she is having severe body aches and feels as though her body is hot but she is not running any fever. She states that she is having trouble walking and that she feels as though she needs to be hospitalized. She states that the meloxicam did not help that we sent in for her. She is taking Tylenol but as soon as it wears off she is crying in pain. I did advise pt that is the pain is that severe that she may want to get evaluated by the ER. She states that she went to the ER and nothing was done. Please advise.

## 2022-08-01 NOTE — TELEPHONE ENCOUNTER
Pt notified of the recommendations. Verbal understanding given. Pt was given an appt for 08/03/2022 at 10:30am

## 2022-08-03 ENCOUNTER — OFFICE VISIT (OUTPATIENT)
Dept: FAMILY MEDICINE | Facility: CLINIC | Age: 77
End: 2022-08-03
Payer: MEDICARE

## 2022-08-03 VITALS
TEMPERATURE: 97 F | HEIGHT: 66 IN | SYSTOLIC BLOOD PRESSURE: 111 MMHG | RESPIRATION RATE: 20 BRPM | DIASTOLIC BLOOD PRESSURE: 74 MMHG | BODY MASS INDEX: 26.71 KG/M2 | WEIGHT: 166.19 LBS | HEART RATE: 93 BPM | OXYGEN SATURATION: 99 %

## 2022-08-03 DIAGNOSIS — M79.10 MYALGIA: Primary | ICD-10-CM

## 2022-08-03 DIAGNOSIS — F41.9 ANXIETY: ICD-10-CM

## 2022-08-03 DIAGNOSIS — G47.00 INSOMNIA, UNSPECIFIED TYPE: ICD-10-CM

## 2022-08-03 PROCEDURE — 1101F PR PT FALLS ASSESS DOC 0-1 FALLS W/OUT INJ PAST YR: ICD-10-PCS | Mod: CPTII,,, | Performed by: STUDENT IN AN ORGANIZED HEALTH CARE EDUCATION/TRAINING PROGRAM

## 2022-08-03 PROCEDURE — 99214 OFFICE O/P EST MOD 30 MIN: CPT | Mod: ,,, | Performed by: STUDENT IN AN ORGANIZED HEALTH CARE EDUCATION/TRAINING PROGRAM

## 2022-08-03 PROCEDURE — 3074F SYST BP LT 130 MM HG: CPT | Mod: CPTII,,, | Performed by: STUDENT IN AN ORGANIZED HEALTH CARE EDUCATION/TRAINING PROGRAM

## 2022-08-03 PROCEDURE — 1125F PR PAIN SEVERITY QUANTIFIED, PAIN PRESENT: ICD-10-PCS | Mod: CPTII,,, | Performed by: STUDENT IN AN ORGANIZED HEALTH CARE EDUCATION/TRAINING PROGRAM

## 2022-08-03 PROCEDURE — 1159F MED LIST DOCD IN RCRD: CPT | Mod: CPTII,,, | Performed by: STUDENT IN AN ORGANIZED HEALTH CARE EDUCATION/TRAINING PROGRAM

## 2022-08-03 PROCEDURE — 1101F PT FALLS ASSESS-DOCD LE1/YR: CPT | Mod: CPTII,,, | Performed by: STUDENT IN AN ORGANIZED HEALTH CARE EDUCATION/TRAINING PROGRAM

## 2022-08-03 PROCEDURE — 3074F PR MOST RECENT SYSTOLIC BLOOD PRESSURE < 130 MM HG: ICD-10-PCS | Mod: CPTII,,, | Performed by: STUDENT IN AN ORGANIZED HEALTH CARE EDUCATION/TRAINING PROGRAM

## 2022-08-03 PROCEDURE — 99214 PR OFFICE/OUTPT VISIT, EST, LEVL IV, 30-39 MIN: ICD-10-PCS | Mod: ,,, | Performed by: STUDENT IN AN ORGANIZED HEALTH CARE EDUCATION/TRAINING PROGRAM

## 2022-08-03 PROCEDURE — 1159F PR MEDICATION LIST DOCUMENTED IN MEDICAL RECORD: ICD-10-PCS | Mod: CPTII,,, | Performed by: STUDENT IN AN ORGANIZED HEALTH CARE EDUCATION/TRAINING PROGRAM

## 2022-08-03 PROCEDURE — 3288F PR FALLS RISK ASSESSMENT DOCUMENTED: ICD-10-PCS | Mod: CPTII,,, | Performed by: STUDENT IN AN ORGANIZED HEALTH CARE EDUCATION/TRAINING PROGRAM

## 2022-08-03 PROCEDURE — 1125F AMNT PAIN NOTED PAIN PRSNT: CPT | Mod: CPTII,,, | Performed by: STUDENT IN AN ORGANIZED HEALTH CARE EDUCATION/TRAINING PROGRAM

## 2022-08-03 PROCEDURE — 3078F DIAST BP <80 MM HG: CPT | Mod: CPTII,,, | Performed by: STUDENT IN AN ORGANIZED HEALTH CARE EDUCATION/TRAINING PROGRAM

## 2022-08-03 PROCEDURE — 3078F PR MOST RECENT DIASTOLIC BLOOD PRESSURE < 80 MM HG: ICD-10-PCS | Mod: CPTII,,, | Performed by: STUDENT IN AN ORGANIZED HEALTH CARE EDUCATION/TRAINING PROGRAM

## 2022-08-03 PROCEDURE — 3288F FALL RISK ASSESSMENT DOCD: CPT | Mod: CPTII,,, | Performed by: STUDENT IN AN ORGANIZED HEALTH CARE EDUCATION/TRAINING PROGRAM

## 2022-08-03 RX ORDER — HYDROXYZINE HYDROCHLORIDE 25 MG/1
TABLET, FILM COATED ORAL
Qty: 90 TABLET | Refills: 0 | Status: SHIPPED | OUTPATIENT
Start: 2022-08-03 | End: 2022-09-19 | Stop reason: SDUPTHER

## 2022-08-03 RX ORDER — CYCLOBENZAPRINE HCL 10 MG
10 TABLET ORAL 3 TIMES DAILY PRN
Qty: 90 TABLET | Refills: 0 | Status: SHIPPED | OUTPATIENT
Start: 2022-08-03 | End: 2022-08-19

## 2022-08-03 NOTE — PROGRESS NOTES
"Subjective:      Patient ID: Milla Rider is a 77 y.o.  female.    Chief Complaint: Generalized Body Aches    Generalized Body Aches: Patient tested positive for COVID 2 weeks ago. She continues to have body aches. She thought it was her arthritis that was flaring up, but the NSAIDs weren't helping much. She denies any fevers, abdominal pain, cough, shortness of breath, chest pain, diarrhea, hematochezia, or melena.    Review of Systems   Constitutional: Positive for chills. Negative for fever.   Eyes: Negative for visual disturbance.   Respiratory: Negative for cough and shortness of breath.    Cardiovascular: Negative for chest pain.   Gastrointestinal: Negative for abdominal pain, blood in stool, diarrhea, nausea and vomiting.   Genitourinary: Negative for dysuria and hematuria.   Musculoskeletal: Positive for arthralgias and myalgias.   Skin: Negative for rash and wound.   Neurological: Negative for dizziness, syncope, numbness and headaches.   Psychiatric/Behavioral: Negative for behavioral problems.     Objective:   /74 (BP Location: Left arm)   Pulse 93   Temp 97.4 °F (36.3 °C)   Resp 20   Ht 5' 6" (1.676 m)   Wt 75.4 kg (166 lb 3.2 oz)   SpO2 99%   BMI 26.83 kg/m²     Physical Exam  Vitals and nursing note reviewed.   Constitutional:       General: She is not in acute distress.     Appearance: Normal appearance. She is not ill-appearing or toxic-appearing.   HENT:      Head: Normocephalic and atraumatic.      Mouth/Throat:      Mouth: Mucous membranes are moist.      Pharynx: Oropharynx is clear.   Cardiovascular:      Rate and Rhythm: Normal rate and regular rhythm.      Heart sounds: Normal heart sounds. No murmur heard.  Pulmonary:      Effort: Pulmonary effort is normal. No respiratory distress.      Breath sounds: Normal breath sounds.   Abdominal:      General: There is no distension.      Palpations: Abdomen is soft.      Tenderness: There is no abdominal tenderness. "   Musculoskeletal:         General: Normal range of motion.      Cervical back: Normal range of motion and neck supple. No tenderness.      Right lower leg: No edema.      Left lower leg: No edema.   Lymphadenopathy:      Cervical: No cervical adenopathy.   Skin:     General: Skin is warm and dry.      Findings: No lesion or rash.   Neurological:      General: No focal deficit present.      Mental Status: She is alert. Mental status is at baseline.   Psychiatric:         Mood and Affect: Mood normal.         Behavior: Behavior normal.         Thought Content: Thought content normal.         Judgment: Judgment normal.       Assessment:     1. Myalgia      Plan:     Problem List Items Addressed This Visit    None     Visit Diagnoses     Myalgia    -  Primary    - Patient informed that post-COVID symptoms can linger for months.  - Continue symptomatic treatment.    Relevant Medications    cyclobenzaprine (FLEXERIL) 10 MG tablet

## 2022-08-04 ENCOUNTER — TELEPHONE (OUTPATIENT)
Dept: FAMILY MEDICINE | Facility: CLINIC | Age: 77
End: 2022-08-04
Payer: MEDICARE

## 2022-08-04 ENCOUNTER — DOCUMENTATION ONLY (OUTPATIENT)
Dept: ADMINISTRATIVE | Facility: HOSPITAL | Age: 77
End: 2022-08-04
Payer: MEDICARE

## 2022-08-04 NOTE — TELEPHONE ENCOUNTER
----- Message from Jenny Harry sent at 8/4/2022  9:14 AM CDT -----  Regarding: pt's endocronoligist info  Type:  Needs Medical Advice    Who Called: pt  Would the patient rather a call back or a response via [facility]? C/b  Best Call Back Number: [phone]  Additional Information: doctor for pt @ [facility] tel # [phone]  endocrinologist

## 2022-08-08 ENCOUNTER — TELEPHONE (OUTPATIENT)
Dept: FAMILY MEDICINE | Facility: CLINIC | Age: 77
End: 2022-08-08
Payer: MEDICARE

## 2022-08-08 NOTE — TELEPHONE ENCOUNTER
Pt called stating that the Flexeril in not helping her pain/body aches. She states that she was advised to call if the Flexeril was not helping and that Dr Oliveira talked about giving her a script of steriods. Please advise

## 2022-08-09 RX ORDER — PREDNISONE 20 MG/1
20 TABLET ORAL DAILY
Qty: 5 TABLET | Refills: 0 | Status: SHIPPED | OUTPATIENT
Start: 2022-08-09 | End: 2022-11-03

## 2022-08-09 NOTE — TELEPHONE ENCOUNTER
Will give a temporary prescription of a steroid burst for patient. Please reinforce that it will take time for patient's symptoms to improve (i.e. - months).

## 2022-09-07 ENCOUNTER — TELEPHONE (OUTPATIENT)
Dept: FAMILY MEDICINE | Facility: CLINIC | Age: 77
End: 2022-09-07
Payer: MEDICARE

## 2022-09-07 NOTE — TELEPHONE ENCOUNTER
----- Message from Sara Cecilia sent at 9/7/2022  9:47 AM CDT -----  Regarding: call back  .Type:  Needs Medical Advice    Who Called: pt   Symptoms (please be specific):    How long has patient had these symptoms:    Pharmacy name and phone #:    Would the patient rather a call back or a response via [facility]? Call back   Best Call Back Number: [phone]  Additional Information: pt wants a call back. She states that it's related to her prescriptions and other health concerns.

## 2022-09-08 NOTE — TELEPHONE ENCOUNTER
LVM for pt regarding medication stating she received 90 tablets on 08/03/22. Pt should still have some tablets left. Left number for call back.

## 2022-09-08 NOTE — TELEPHONE ENCOUNTER
----- Message from Chioma Bravo sent at 9/8/2022  2:32 PM CDT -----  Regarding: Meds  .Type:  RX Refill Request    Who Called: Pt   Refill or New Rx:Refill  RX Name and Strength:hydrOXYzine HCL (ATARAX) 25 MG tablet  How is the patient currently taking it? (ex. 1XDay):1xday  Is this a 30 day or 90 day RX:90  Preferred Pharmacy with phone number:[facility]/PHARMACY #[id number]  [place] - [address]  Local or Mail Order:Local  Ordering Provider:Roxanne  Would the patient rather a call back or a response via [facility]? Call back  Best Call Back Number:[phone]  Additional Information:

## 2022-09-19 DIAGNOSIS — G47.00 INSOMNIA, UNSPECIFIED TYPE: ICD-10-CM

## 2022-09-19 DIAGNOSIS — F41.9 ANXIETY: ICD-10-CM

## 2022-09-19 RX ORDER — HYDROXYZINE HYDROCHLORIDE 25 MG/1
25 TABLET, FILM COATED ORAL NIGHTLY
Qty: 90 TABLET | Refills: 0 | Status: SHIPPED | OUTPATIENT
Start: 2022-09-19 | End: 2022-11-03 | Stop reason: DRUGHIGH

## 2022-09-19 NOTE — TELEPHONE ENCOUNTER
----- Message from Chioma Bravo sent at 9/19/2022  8:54 AM CDT -----  Regarding: Med Refill  .Type:  RX Refill Request    Who Called: Pt  Refill or New Rx:Refill  RX Name and Strength:hydrOXYzine HCL (ATARAX) 25 MG tablet  How is the patient currently taking it? (ex. 1XDay):1xday  Is this a 30 day or 90 day RX:  Preferred Pharmacy with phone number:[facility]/PHARMACY #[id number]  [place] - [address]  Local or Mail Order:Local  Ordering Provider:Roxanne  Would the patient rather a call back or a response via [facility]? Call back  Best Call Back Number:[phone]  Additional Information:

## 2022-10-07 DIAGNOSIS — M19.90 OSTEOARTHRITIS, UNSPECIFIED OSTEOARTHRITIS TYPE, UNSPECIFIED SITE: Primary | ICD-10-CM

## 2022-10-07 RX ORDER — MELOXICAM 7.5 MG/1
7.5 TABLET ORAL DAILY PRN
Qty: 90 TABLET | Refills: 0 | Status: SHIPPED | OUTPATIENT
Start: 2022-10-07 | End: 2022-11-03 | Stop reason: DRUGHIGH

## 2022-10-07 NOTE — TELEPHONE ENCOUNTER
----- Message from Kamilla Navarro sent at 10/7/2022  9:50 AM CDT -----  Regarding: refill  .Type:  RX Refill Request    Who Called: pt  Refill or New Rx:refill  RX Name and Strength:meloxicam (MOBIC) 7.5 MG tablet  How is the patient currently taking it? (ex. 1XDay):  Is this a 30 day or 90 day RX:  Preferred Pharmacy with phone number:[facility]/pharmacy #[id number]  [place] - [address]  Local or Mail Order:local  Ordering Provider:Roxanne  Would the patient rather a call back or a response via [facility]?   Best Call Back Number:[phone]   Additional Information:

## 2022-11-03 ENCOUNTER — OFFICE VISIT (OUTPATIENT)
Dept: FAMILY MEDICINE | Facility: CLINIC | Age: 77
End: 2022-11-03
Payer: MEDICARE

## 2022-11-03 VITALS
HEIGHT: 65 IN | RESPIRATION RATE: 20 BRPM | DIASTOLIC BLOOD PRESSURE: 75 MMHG | SYSTOLIC BLOOD PRESSURE: 112 MMHG | TEMPERATURE: 98 F | WEIGHT: 167.19 LBS | HEART RATE: 93 BPM | OXYGEN SATURATION: 99 % | BODY MASS INDEX: 27.86 KG/M2

## 2022-11-03 DIAGNOSIS — G47.00 INSOMNIA, UNSPECIFIED TYPE: ICD-10-CM

## 2022-11-03 DIAGNOSIS — M25.50 ARTHRALGIA, UNSPECIFIED JOINT: ICD-10-CM

## 2022-11-03 DIAGNOSIS — Z00.00 MEDICARE ANNUAL WELLNESS VISIT, SUBSEQUENT: ICD-10-CM

## 2022-11-03 DIAGNOSIS — Z23 NEED FOR INFLUENZA VACCINATION: ICD-10-CM

## 2022-11-03 DIAGNOSIS — F41.9 ANXIETY: ICD-10-CM

## 2022-11-03 DIAGNOSIS — M79.10 MYALGIA: Primary | ICD-10-CM

## 2022-11-03 DIAGNOSIS — I10 PRIMARY HYPERTENSION: ICD-10-CM

## 2022-11-03 DIAGNOSIS — Z11.59 ENCOUNTER FOR HEPATITIS C SCREENING TEST FOR LOW RISK PATIENT: ICD-10-CM

## 2022-11-03 DIAGNOSIS — L29.9 PRURITUS: ICD-10-CM

## 2022-11-03 PROCEDURE — 99214 PR OFFICE/OUTPT VISIT, EST, LEVL IV, 30-39 MIN: ICD-10-PCS | Mod: 25,,, | Performed by: STUDENT IN AN ORGANIZED HEALTH CARE EDUCATION/TRAINING PROGRAM

## 2022-11-03 PROCEDURE — 3078F PR MOST RECENT DIASTOLIC BLOOD PRESSURE < 80 MM HG: ICD-10-PCS | Mod: CPTII,,, | Performed by: STUDENT IN AN ORGANIZED HEALTH CARE EDUCATION/TRAINING PROGRAM

## 2022-11-03 PROCEDURE — G0008 FLU VACCINE - QUADRIVALENT - ADJUVANTED: ICD-10-PCS | Mod: ,,, | Performed by: STUDENT IN AN ORGANIZED HEALTH CARE EDUCATION/TRAINING PROGRAM

## 2022-11-03 PROCEDURE — 90694 FLU VACCINE - QUADRIVALENT - ADJUVANTED: ICD-10-PCS | Mod: ,,, | Performed by: STUDENT IN AN ORGANIZED HEALTH CARE EDUCATION/TRAINING PROGRAM

## 2022-11-03 PROCEDURE — 3074F PR MOST RECENT SYSTOLIC BLOOD PRESSURE < 130 MM HG: ICD-10-PCS | Mod: CPTII,,, | Performed by: STUDENT IN AN ORGANIZED HEALTH CARE EDUCATION/TRAINING PROGRAM

## 2022-11-03 PROCEDURE — 3078F DIAST BP <80 MM HG: CPT | Mod: CPTII,,, | Performed by: STUDENT IN AN ORGANIZED HEALTH CARE EDUCATION/TRAINING PROGRAM

## 2022-11-03 PROCEDURE — 1125F PR PAIN SEVERITY QUANTIFIED, PAIN PRESENT: ICD-10-PCS | Mod: CPTII,,, | Performed by: STUDENT IN AN ORGANIZED HEALTH CARE EDUCATION/TRAINING PROGRAM

## 2022-11-03 PROCEDURE — 90694 VACC AIIV4 NO PRSRV 0.5ML IM: CPT | Mod: ,,, | Performed by: STUDENT IN AN ORGANIZED HEALTH CARE EDUCATION/TRAINING PROGRAM

## 2022-11-03 PROCEDURE — 3074F SYST BP LT 130 MM HG: CPT | Mod: CPTII,,, | Performed by: STUDENT IN AN ORGANIZED HEALTH CARE EDUCATION/TRAINING PROGRAM

## 2022-11-03 PROCEDURE — G0008 ADMIN INFLUENZA VIRUS VAC: HCPCS | Mod: ,,, | Performed by: STUDENT IN AN ORGANIZED HEALTH CARE EDUCATION/TRAINING PROGRAM

## 2022-11-03 PROCEDURE — 99214 OFFICE O/P EST MOD 30 MIN: CPT | Mod: 25,,, | Performed by: STUDENT IN AN ORGANIZED HEALTH CARE EDUCATION/TRAINING PROGRAM

## 2022-11-03 PROCEDURE — 1125F AMNT PAIN NOTED PAIN PRSNT: CPT | Mod: CPTII,,, | Performed by: STUDENT IN AN ORGANIZED HEALTH CARE EDUCATION/TRAINING PROGRAM

## 2022-11-03 PROCEDURE — 3288F PR FALLS RISK ASSESSMENT DOCUMENTED: ICD-10-PCS | Mod: CPTII,,, | Performed by: STUDENT IN AN ORGANIZED HEALTH CARE EDUCATION/TRAINING PROGRAM

## 2022-11-03 PROCEDURE — 1101F PR PT FALLS ASSESS DOC 0-1 FALLS W/OUT INJ PAST YR: ICD-10-PCS | Mod: CPTII,,, | Performed by: STUDENT IN AN ORGANIZED HEALTH CARE EDUCATION/TRAINING PROGRAM

## 2022-11-03 PROCEDURE — 3288F FALL RISK ASSESSMENT DOCD: CPT | Mod: CPTII,,, | Performed by: STUDENT IN AN ORGANIZED HEALTH CARE EDUCATION/TRAINING PROGRAM

## 2022-11-03 PROCEDURE — 1101F PT FALLS ASSESS-DOCD LE1/YR: CPT | Mod: CPTII,,, | Performed by: STUDENT IN AN ORGANIZED HEALTH CARE EDUCATION/TRAINING PROGRAM

## 2022-11-03 RX ORDER — MELOXICAM 15 MG/1
15 TABLET ORAL DAILY
Qty: 90 TABLET | Refills: 0 | Status: SHIPPED | OUTPATIENT
Start: 2022-11-03 | End: 2023-02-06

## 2022-11-03 RX ORDER — HYDROXYZINE HYDROCHLORIDE 50 MG/1
50 TABLET, FILM COATED ORAL NIGHTLY PRN
Qty: 90 TABLET | Refills: 3 | Status: SHIPPED | OUTPATIENT
Start: 2022-11-03 | End: 2023-08-17 | Stop reason: ALTCHOICE

## 2022-11-03 NOTE — ASSESSMENT & PLAN NOTE
- Patient following with Dr. Tiffanie Piper with Endocrinology in [place], [place].  - Patient taking vitamin D + calcium supplementation daily.

## 2022-11-03 NOTE — ASSESSMENT & PLAN NOTE
- Patient following with Dr. Geovanna Soliz with Hematology in [place], [place]. She sees Hematology every 6 months.  - She is taking iron supplementation every other day.

## 2022-11-03 NOTE — PROGRESS NOTES
"Subjective:     Subjective:      Patient ID: Milla Rider is a 77 y.o.  female.    Chief Complaint: Muscle/Joint Pain and Insomnia    Muscle/Joint Pain: Patient states since she had COVID this summer, she's had muscle/joint pain. This is located to her fingers, hands, wrists, neck, shoulders, hips, and knees. She says that Mobic helps, but then it wears off. Nothing makes it better. When she doesn't sleep, it makes this worse. She expresses that the pain waxes and wanes throughout the day as well. Patient is currently doing therapy for right upper extremity lymphedema at [facility] in [place], [place].    Insomnia: Patient initially prescribed Vistaril 25mg nightly, but admits she increased this to 50mg nightly.    Review of Systems   Constitutional:  Negative for chills and fever.   Respiratory:  Negative for shortness of breath.    Cardiovascular:  Negative for chest pain.   Gastrointestinal:  Negative for abdominal pain, nausea and vomiting.   Musculoskeletal:  Positive for arthralgias and myalgias.   Skin:  Negative for rash and wound.   Neurological:  Negative for dizziness, weakness, numbness and headaches.   Psychiatric/Behavioral:  Positive for sleep disturbance. Negative for dysphoric mood.      Objective:   /75 (BP Location: Left arm, Patient Position: Sitting, BP Method: Large (Automatic))   Pulse 93   Temp 97.6 °F (36.4 °C) (Temporal)   Resp 20   Ht 5' 5" (1.651 m)   Wt 75.8 kg (167 lb 3.2 oz)   SpO2 99%   BMI 27.82 kg/m²     Physical Exam  Vitals and nursing note reviewed.   Constitutional:       General: She is not in acute distress.     Appearance: Normal appearance. She is not ill-appearing or toxic-appearing.   HENT:      Head: Normocephalic and atraumatic.   Cardiovascular:      Rate and Rhythm: Normal rate and regular rhythm.      Heart sounds: Normal heart sounds. No murmur heard.  Pulmonary:      Effort: Pulmonary effort is normal. No respiratory distress.      " Breath sounds: Normal breath sounds.   Abdominal:      General: There is no distension.      Palpations: Abdomen is soft.      Tenderness: There is no abdominal tenderness.   Musculoskeletal:         General: Swelling (RUE) present. No tenderness, deformity or signs of injury. Normal range of motion.      Right lower leg: No edema.      Left lower leg: No edema.   Skin:     General: Skin is warm and dry.      Findings: No bruising, lesion or rash.   Neurological:      General: No focal deficit present.      Mental Status: She is alert. Mental status is at baseline.      Cranial Nerves: No cranial nerve deficit.      Sensory: No sensory deficit.      Motor: No weakness.   Psychiatric:         Mood and Affect: Mood normal.         Behavior: Behavior normal.         Thought Content: Thought content normal.         Judgment: Judgment normal.     Assessment/Plan:   1. Myalgia  -     Sedimentation rate; Future; Expected date: 11/03/2022  -     C-Reactive Protein; Future; Expected date: 11/03/2022  -     meloxicam (MOBIC) 15 MG tablet; Take 1 tablet (15 mg total) by mouth once daily.  Dispense: 90 tablet; Refill: 0    2. Arthralgia, unspecified joint  -     Sedimentation rate; Future; Expected date: 11/03/2022  -     C-Reactive Protein; Future; Expected date: 11/03/2022  -     meloxicam (MOBIC) 15 MG tablet; Take 1 tablet (15 mg total) by mouth once daily.  Dispense: 90 tablet; Refill: 0    3. Insomnia, unspecified type  Assessment & Plan:  - She takes melatonin PRN, but continues to have a hard time sleeping.   - She had taken a temporary prescription of Ambien recently and this helped.  - Starting Vistaril 25mg nightly. Patient instructed she can take an additional tablet to equal 50mg nightly.  - Re-evaluation in 1 month.    Orders:  -     hydrOXYzine (ATARAX) 50 MG tablet; Take 1 tablet (50 mg total) by mouth nightly as needed for Itching or Anxiety.  Dispense: 90 tablet; Refill: 3    4. Anxiety  Assessment & Plan:  -  Vistaril nightly.    Orders:  -     hydrOXYzine (ATARAX) 50 MG tablet; Take 1 tablet (50 mg total) by mouth nightly as needed for Itching or Anxiety.  Dispense: 90 tablet; Refill: 3    5. Pruritus  Assessment & Plan:  - Vistaril nightly.    Orders:  -     hydrOXYzine (ATARAX) 50 MG tablet; Take 1 tablet (50 mg total) by mouth nightly as needed for Itching or Anxiety.  Dispense: 90 tablet; Refill: 3    6. Medicare annual wellness visit, subsequent  -     Hepatitis C Antibody; Future; Expected date: 11/03/2022  -     Lipid Panel; Future; Expected date: 11/03/2022    7. Encounter for hepatitis C screening test for low risk patient  -     Hepatitis C Antibody; Future; Expected date: 11/03/2022    8. Primary hypertension  Assessment & Plan:  - /75, well-controlled.  - Continue Amlodipine-Benazepril 10mg-20mg daily.      Orders:  -     Basic Metabolic Panel; Future; Expected date: 11/03/2022    9. Need for influenza vaccination  -     Influenza - Quadrivalent (Adjuvanted)         Follow up in about 1 month (around 12/3/2022) for Medicare Wellness.

## 2022-11-03 NOTE — ASSESSMENT & PLAN NOTE
- Patient following with [facility] in [place], [place]. Patient following with Dr. Long Mathis with Oncology. She sees Oncology every 6 months.  - She was diagnosed in October 2020.  - Patient had right breast mastectomy and 43 lymph nodes removed surrounding right breast on 11/16/20.  - Patient is taking anastrazole for a total of 5 years. Anastrazole per Oncology.

## 2022-12-01 ENCOUNTER — LAB VISIT (OUTPATIENT)
Dept: LAB | Facility: HOSPITAL | Age: 77
End: 2022-12-01
Attending: STUDENT IN AN ORGANIZED HEALTH CARE EDUCATION/TRAINING PROGRAM
Payer: MEDICARE

## 2022-12-01 DIAGNOSIS — M79.10 MYALGIA: ICD-10-CM

## 2022-12-01 DIAGNOSIS — Z11.59 ENCOUNTER FOR HEPATITIS C SCREENING TEST FOR LOW RISK PATIENT: ICD-10-CM

## 2022-12-01 DIAGNOSIS — Z00.00 MEDICARE ANNUAL WELLNESS VISIT, SUBSEQUENT: ICD-10-CM

## 2022-12-01 DIAGNOSIS — M25.50 ARTHRALGIA, UNSPECIFIED JOINT: ICD-10-CM

## 2022-12-01 DIAGNOSIS — I10 PRIMARY HYPERTENSION: ICD-10-CM

## 2022-12-01 LAB
ANION GAP SERPL CALC-SCNC: 9 MEQ/L
BUN SERPL-MCNC: 18.7 MG/DL (ref 9.8–20.1)
CALCIUM SERPL-MCNC: 10.3 MG/DL (ref 8.4–10.2)
CHLORIDE SERPL-SCNC: 104 MMOL/L (ref 98–107)
CO2 SERPL-SCNC: 27 MMOL/L (ref 23–31)
CREAT SERPL-MCNC: 0.78 MG/DL (ref 0.55–1.02)
CREAT/UREA NIT SERPL: 24
CRP SERPL HS-MCNC: 8 MG/L
ERYTHROCYTE [SEDIMENTATION RATE] IN BLOOD: 77 MM/HR (ref 0–20)
GFR SERPLBLD CREATININE-BSD FMLA CKD-EPI: >60 MLS/MIN/1.73/M2
GLUCOSE SERPL-MCNC: 108 MG/DL (ref 82–115)
HCV AB SERPL QL IA: NONREACTIVE
POTASSIUM SERPL-SCNC: 3.8 MMOL/L (ref 3.5–5.1)
SODIUM SERPL-SCNC: 140 MMOL/L (ref 136–145)

## 2022-12-01 PROCEDURE — 86803 HEPATITIS C AB TEST: CPT

## 2022-12-01 PROCEDURE — 86141 C-REACTIVE PROTEIN HS: CPT

## 2022-12-01 PROCEDURE — 85651 RBC SED RATE NONAUTOMATED: CPT

## 2022-12-01 PROCEDURE — 36415 COLL VENOUS BLD VENIPUNCTURE: CPT

## 2022-12-01 PROCEDURE — 80048 BASIC METABOLIC PNL TOTAL CA: CPT

## 2022-12-05 ENCOUNTER — OFFICE VISIT (OUTPATIENT)
Dept: FAMILY MEDICINE | Facility: CLINIC | Age: 77
End: 2022-12-05
Payer: MEDICARE

## 2022-12-05 ENCOUNTER — TELEPHONE (OUTPATIENT)
Dept: FAMILY MEDICINE | Facility: CLINIC | Age: 77
End: 2022-12-05

## 2022-12-05 VITALS
HEIGHT: 65 IN | RESPIRATION RATE: 18 BRPM | DIASTOLIC BLOOD PRESSURE: 75 MMHG | TEMPERATURE: 99 F | HEART RATE: 91 BPM | WEIGHT: 167.38 LBS | OXYGEN SATURATION: 98 % | BODY MASS INDEX: 27.89 KG/M2 | SYSTOLIC BLOOD PRESSURE: 123 MMHG

## 2022-12-05 DIAGNOSIS — F41.9 ANXIETY: ICD-10-CM

## 2022-12-05 DIAGNOSIS — Z11.59 ENCOUNTER FOR HEPATITIS C SCREENING TEST FOR LOW RISK PATIENT: ICD-10-CM

## 2022-12-05 DIAGNOSIS — I10 PRIMARY HYPERTENSION: ICD-10-CM

## 2022-12-05 DIAGNOSIS — Z23 NEED FOR PNEUMOCOCCAL VACCINATION: ICD-10-CM

## 2022-12-05 DIAGNOSIS — G47.00 INSOMNIA, UNSPECIFIED TYPE: ICD-10-CM

## 2022-12-05 DIAGNOSIS — L29.9 PRURITUS: ICD-10-CM

## 2022-12-05 DIAGNOSIS — Z00.00 MEDICARE ANNUAL WELLNESS VISIT, SUBSEQUENT: Primary | ICD-10-CM

## 2022-12-05 DIAGNOSIS — M81.0 OSTEOPOROSIS, UNSPECIFIED OSTEOPOROSIS TYPE, UNSPECIFIED PATHOLOGICAL FRACTURE PRESENCE: ICD-10-CM

## 2022-12-05 DIAGNOSIS — J30.9 ALLERGIC RHINITIS, UNSPECIFIED SEASONALITY, UNSPECIFIED TRIGGER: ICD-10-CM

## 2022-12-05 DIAGNOSIS — E83.52 HYPERCALCEMIA: ICD-10-CM

## 2022-12-05 DIAGNOSIS — R73.03 PREDIABETES: ICD-10-CM

## 2022-12-05 DIAGNOSIS — C50.911 MUCINOUS CARCINOMA OF RIGHT BREAST: ICD-10-CM

## 2022-12-05 DIAGNOSIS — D50.9 IRON DEFICIENCY ANEMIA, UNSPECIFIED IRON DEFICIENCY ANEMIA TYPE: ICD-10-CM

## 2022-12-05 PROCEDURE — 3078F PR MOST RECENT DIASTOLIC BLOOD PRESSURE < 80 MM HG: ICD-10-PCS | Mod: CPTII,,, | Performed by: STUDENT IN AN ORGANIZED HEALTH CARE EDUCATION/TRAINING PROGRAM

## 2022-12-05 PROCEDURE — 1101F PT FALLS ASSESS-DOCD LE1/YR: CPT | Mod: CPTII,,, | Performed by: STUDENT IN AN ORGANIZED HEALTH CARE EDUCATION/TRAINING PROGRAM

## 2022-12-05 PROCEDURE — 3288F FALL RISK ASSESSMENT DOCD: CPT | Mod: CPTII,,, | Performed by: STUDENT IN AN ORGANIZED HEALTH CARE EDUCATION/TRAINING PROGRAM

## 2022-12-05 PROCEDURE — 1125F PR PAIN SEVERITY QUANTIFIED, PAIN PRESENT: ICD-10-PCS | Mod: CPTII,,, | Performed by: STUDENT IN AN ORGANIZED HEALTH CARE EDUCATION/TRAINING PROGRAM

## 2022-12-05 PROCEDURE — 1159F PR MEDICATION LIST DOCUMENTED IN MEDICAL RECORD: ICD-10-PCS | Mod: CPTII,,, | Performed by: STUDENT IN AN ORGANIZED HEALTH CARE EDUCATION/TRAINING PROGRAM

## 2022-12-05 PROCEDURE — 3074F SYST BP LT 130 MM HG: CPT | Mod: CPTII,,, | Performed by: STUDENT IN AN ORGANIZED HEALTH CARE EDUCATION/TRAINING PROGRAM

## 2022-12-05 PROCEDURE — 90677 PNEUMOCOCCAL CONJUGATE VACCINE 20-VALENT: ICD-10-PCS | Mod: ,,, | Performed by: STUDENT IN AN ORGANIZED HEALTH CARE EDUCATION/TRAINING PROGRAM

## 2022-12-05 PROCEDURE — 90677 PCV20 VACCINE IM: CPT | Mod: ,,, | Performed by: STUDENT IN AN ORGANIZED HEALTH CARE EDUCATION/TRAINING PROGRAM

## 2022-12-05 PROCEDURE — 3074F PR MOST RECENT SYSTOLIC BLOOD PRESSURE < 130 MM HG: ICD-10-PCS | Mod: CPTII,,, | Performed by: STUDENT IN AN ORGANIZED HEALTH CARE EDUCATION/TRAINING PROGRAM

## 2022-12-05 PROCEDURE — G0009 PNEUMOCOCCAL CONJUGATE VACCINE 20-VALENT: ICD-10-PCS | Mod: ,,, | Performed by: STUDENT IN AN ORGANIZED HEALTH CARE EDUCATION/TRAINING PROGRAM

## 2022-12-05 PROCEDURE — G0439 PR MEDICARE ANNUAL WELLNESS SUBSEQUENT VISIT: ICD-10-PCS | Mod: ,,, | Performed by: STUDENT IN AN ORGANIZED HEALTH CARE EDUCATION/TRAINING PROGRAM

## 2022-12-05 PROCEDURE — 3078F DIAST BP <80 MM HG: CPT | Mod: CPTII,,, | Performed by: STUDENT IN AN ORGANIZED HEALTH CARE EDUCATION/TRAINING PROGRAM

## 2022-12-05 PROCEDURE — 1125F AMNT PAIN NOTED PAIN PRSNT: CPT | Mod: CPTII,,, | Performed by: STUDENT IN AN ORGANIZED HEALTH CARE EDUCATION/TRAINING PROGRAM

## 2022-12-05 PROCEDURE — G0009 ADMIN PNEUMOCOCCAL VACCINE: HCPCS | Mod: ,,, | Performed by: STUDENT IN AN ORGANIZED HEALTH CARE EDUCATION/TRAINING PROGRAM

## 2022-12-05 PROCEDURE — 3288F PR FALLS RISK ASSESSMENT DOCUMENTED: ICD-10-PCS | Mod: CPTII,,, | Performed by: STUDENT IN AN ORGANIZED HEALTH CARE EDUCATION/TRAINING PROGRAM

## 2022-12-05 PROCEDURE — 1101F PR PT FALLS ASSESS DOC 0-1 FALLS W/OUT INJ PAST YR: ICD-10-PCS | Mod: CPTII,,, | Performed by: STUDENT IN AN ORGANIZED HEALTH CARE EDUCATION/TRAINING PROGRAM

## 2022-12-05 PROCEDURE — 1159F MED LIST DOCD IN RCRD: CPT | Mod: CPTII,,, | Performed by: STUDENT IN AN ORGANIZED HEALTH CARE EDUCATION/TRAINING PROGRAM

## 2022-12-05 PROCEDURE — G0439 PPPS, SUBSEQ VISIT: HCPCS | Mod: ,,, | Performed by: STUDENT IN AN ORGANIZED HEALTH CARE EDUCATION/TRAINING PROGRAM

## 2022-12-05 RX ORDER — FLUTICASONE PROPIONATE 50 MCG
2 SPRAY, SUSPENSION (ML) NASAL DAILY
Qty: 16 G | Refills: 3 | Status: SHIPPED | OUTPATIENT
Start: 2022-12-05 | End: 2023-06-13 | Stop reason: SDUPTHER

## 2022-12-05 RX ORDER — FLUTICASONE PROPIONATE 50 MCG
2 SPRAY, SUSPENSION (ML) NASAL
COMMUNITY
Start: 2022-07-16 | End: 2022-12-05 | Stop reason: SDUPTHER

## 2022-12-05 NOTE — TELEPHONE ENCOUNTER
----- Message from Laim Yip sent at 12/5/2022  1:36 PM CST -----  Regarding: call back  .Type:  Needs Medical Advice    Who Called: billie  Would the patient rather a call back or a response via [facility]? [email or web address]  Best Call Back Number: [phone]  Additional Information: Pt would like a call back from the nurse she didn't go into details she said it's about something they spoke about earlier today.

## 2022-12-05 NOTE — PROGRESS NOTES
Subjective:      Patient ID: Milla Rider is a 77 y.o.  female.    Chief Complaint: Medicare Wellness    Preventative Health: BMP positive for hypercalcemia with calcium 10.3. She states she's been taking a lot of Tums and Mylanta. Lipid panel positive for elevated cholesterol 214. ESR 77 and CRP 8; both elevated. Patient states her myalgias are improving and is not amenable to Rheumatology referral at this time. Hepatitis C Antibody negative. Patient had a hysterectomy unrelated to CA.    Mucinous Carcinoma of Right Breast: Patient following with [facility] in [place], [place]. Patient following with Dr. Long Mathis with Oncology. She sees Oncology every 6 months; she has a follow-up with Oncology on 12/21/22. She was diagnosed in October 2020. Patient had right breast mastectomy and 43 lymph nodes removed surrounding right breast on 11/16/20. Patient is taking anastrazole for a total of 5 years.     KAREN: Patient following with Dr. Geovanna Soliz with Hematology in [place], [place]. She sees Hematology every 6 months. She is taking iron supplementation every other day.    HTN: /75. Patient states compliance with Lotrel.     Insomnia/Anxiety/Pruritis: Patient tolerating Hydroxyzine nightly well.    Osteoporosis: Patient following with Dr. Tiffanie Piper with Endocrinology in [place], [place]. Patient taking vitamin D + calcium supplementation, but patient states she has been inconsistent with this. She reports having an upcoming DEXA in February 2022.    Review of Systems   Constitutional:  Negative for chills and fever.   Eyes:  Negative for visual disturbance.   Respiratory:  Negative for shortness of breath.    Cardiovascular:  Negative for chest pain.   Gastrointestinal:  Negative for abdominal pain, blood in stool, nausea and vomiting.   Genitourinary:  Negative for dysuria and hematuria.   Musculoskeletal:  Positive for arthralgias and myalgias (improving).   Skin:  Negative for rash and wound.  "  Neurological:  Negative for dizziness, weakness, numbness and headaches.   Psychiatric/Behavioral:  Negative for dysphoric mood and sleep disturbance. The patient is not nervous/anxious.      Objective:   /75 (BP Location: Left arm, Patient Position: Sitting, BP Method: Large (Automatic))   Pulse 91   Temp 98.5 °F (36.9 °C) (Temporal)   Resp 18   Ht 5' 5" (1.651 m)   Wt 75.9 kg (167 lb 6.4 oz)   SpO2 98%   BMI 27.86 kg/m²     Physical Exam  Vitals and nursing note reviewed.   Constitutional:       General: She is not in acute distress.     Appearance: Normal appearance. She is not ill-appearing or toxic-appearing.   HENT:      Head: Normocephalic and atraumatic.      Mouth/Throat:      Mouth: Mucous membranes are moist.      Pharynx: Oropharynx is clear.   Eyes:      Conjunctiva/sclera: Conjunctivae normal.   Cardiovascular:      Rate and Rhythm: Normal rate and regular rhythm.      Heart sounds: Normal heart sounds. No murmur heard.  Pulmonary:      Effort: Pulmonary effort is normal. No respiratory distress.      Breath sounds: Normal breath sounds.   Abdominal:      General: There is no distension.      Palpations: Abdomen is soft.      Tenderness: There is no abdominal tenderness.   Musculoskeletal:         General: No deformity.      Cervical back: Neck supple. No tenderness.      Right lower leg: No edema.      Left lower leg: No edema.   Lymphadenopathy:      Cervical: No cervical adenopathy.   Skin:     General: Skin is warm and dry.      Findings: No lesion or rash.   Neurological:      General: No focal deficit present.      Mental Status: She is alert. Mental status is at baseline.   Psychiatric:         Mood and Affect: Mood normal.         Behavior: Behavior normal.         Thought Content: Thought content normal.         Judgment: Judgment normal.     Assessment/Plan:   1. Medicare annual wellness visit, subsequent  -     Pneumococcal Conjugate Vaccine (20 Valent) (IM)    2. Need for " pneumococcal vaccination  -     Pneumococcal Conjugate Vaccine (20 Valent) (IM)    3. Encounter for hepatitis C screening test for low risk patient    4. Mucinous carcinoma of right breast  Assessment & Plan:  - Patient following with [facility] in [place], [place]. Patient following with Dr. Long Mathis with Oncology. She sees Oncology every 6 months.  - She was diagnosed in October 2020.  - Patient had right breast mastectomy and 43 lymph nodes removed surrounding right breast on 11/16/20.  - Patient is taking anastrazole for a total of 5 years. Anastrazole per Oncology.        5. Iron deficiency anemia, unspecified iron deficiency anemia type  Assessment & Plan:  - Patient following with Dr. Geovanna Soliz with Hematology in [place], [place]. She sees Hematology every 6 months.  - She is taking iron supplementation every other day.      6. Primary hypertension  Assessment & Plan:  - /75, well-controlled.  - Continue Amlodipine-Benazepril 10mg-20mg daily.        7. Insomnia, unspecified type  Assessment & Plan:  - Continue Hydroxyzine 50 nightly PRN.      8. Anxiety  Assessment & Plan:  - Continue Hydroxyzine 50 nightly PRN.      9. Pruritus  Assessment & Plan:  - Continue Hydroxyzine 50 nightly PRN.      10. Osteoporosis, unspecified osteoporosis type, unspecified pathological fracture presence  Assessment & Plan:  - Patient following with Dr. Tiffanie Piper with Endocrinology in [place], [place].  - Patient taking vitamin D + calcium supplementation daily.  - She reports having an upcoming DEXA in February 2022.      11. Prediabetes  -     Hemoglobin A1C; Future; Expected date: 12/05/2022    12. Allergic rhinitis, unspecified seasonality, unspecified trigger  -     fluticasone propionate (FLONASE) 50 mcg/actuation nasal spray; 2 sprays (100 mcg total) by Each Nostril route once daily.  Dispense: 16 g; Refill: 3    13. Hypercalcemia  -     Calcium, Ionized; Future; Expected date: 12/05/2022  -     PTH, Intact; Future; Expected  date: 12/05/2022  -     Vitamin D; Future; Expected date: 12/05/2022     Opioid Screening: Patient medication list reviewed, patient is not taking prescription opioids. Patient is not using additional opioids than prescribed. Patient is at low risk of substance abuse based on this opioid use history.     Patient Reported Health Risk Assessment  What [age] 70-79  Are you male or female?: Female  During the past four weeks, how much have you been bothered by emotional problems such as feeling anxious, depressed, irritable, sad, or downhearted and blue?: Not at all  During the past five weeks, has your physical and/or emotional health limited your social activities with family, friends, neighbors, or groups?: Not at all  During the past four weeks, how much bodily pain have you generally had?: Moderate pain  During the past four weeks, was someone available to help if you needed and wanted help?: Yes, as much as I wanted  During the past four weeks, what was the hardest physical activity you could do for at least two minutes?: Light  Can you get to places out of walking distance without help?  (For example, can you travel alone on buses or taxis, or drive your own car?): Yes  Can you go shopping for groceries or clothes without someone's help?: Yes  Can you prepare your own meals?: Yes  Can you do your own housework without help?: Yes  Because of any health problems, do you need the help of another person with your personal care needs such as eating, bathing, dressing, or getting around the house?: No  Can you handle your own money without help?: Yes  During the past four weeks, how would you rate your health in general?: Fair  How have things been going for you during the past four weeks?: Pretty well  Are you having difficulties driving your car?: No  Do you always fasten your seat belt when you are in a car?: Yes, usually  How often in the past four weeks have you been bothered by falling or dizzy when  standing up?: Seldom  How often in the past four weeks have you been bothered by trouble eating well?: Never  How often in the past four weeks have you been bothered by teeth or denture problems?: Never  How often in the past four weeks have you been bothered with problems using the telephone?: Never  How often in the past four weeks have you been bothered by tiredness or fatigue?: Often  Have you fallen two or more times in the past year?: No  Are you afraid of falling?: No  Are you a smoker?: No  During the past four weeks, how many drinks of wine, beer, or other alcoholic beverages did you have?: No alcohol at all  Do you exercise for about 20 minutes three or more days a week?: Yes, most of the time  Have you been given any information to help you with hazards in your house that might hurt you?: No  Have you been given any information to help you with keeping track of your medications?: No  How often do you have trouble taking medicines the way you've been told to take them?: I always take them as prescribed  How confident are you that you can control and manage most of your health problems?: Very confident  What is your race? (Check all that apply.):     Medicare Annual Wellness and Personalized Prevention Plan:   Fall Risk + Home Safety + Hearing Impairment + Depression Screen + Opioid and Substance Abuse Screening + Cognitive Impairment Screen + Health Risk Assessment all reviewed.     Advance Care Planning   I attest to discussing Advance Care Planning with patient and/or family member.  Education was provided including the importance of the Health Care Power of , Advance Directives, and/or [place] documentation.  The patient expressed understanding to the importance of this information and discussion.       Follow up in about 6 months (around 6/5/2023).

## 2022-12-05 NOTE — ASSESSMENT & PLAN NOTE
- Patient following with [facility] in [place], [place]. Patient following with Dr. Long Mathis with Oncology. She sees Oncology every 6 months.  - She was diagnosed in October 2020.  - Patient had right breast mastectomy and 43 lymph nodes removed surrounding right breast on 11/16/20.  - Patient is taking anastrazole for a total of 5 years. Anastrazole per Oncology.

## 2022-12-05 NOTE — ASSESSMENT & PLAN NOTE
- Patient following with Dr. Geovanna Soliz with Hematology in [place], [place]. She sees Hematology every 6 months.  - She is taking iron supplementation every other day.

## 2022-12-05 NOTE — TELEPHONE ENCOUNTER
Called pt back. She wanted to know what her Calcium level was. She states that she could not remember from her office visit this am. Results given to pt

## 2022-12-05 NOTE — ASSESSMENT & PLAN NOTE
- Patient following with Dr. Tiffanie Piper with Endocrinology in [place], [place].  - Patient taking vitamin D + calcium supplementation daily.  - She reports having an upcoming DEXA in February 2022.

## 2022-12-19 ENCOUNTER — TELEPHONE (OUTPATIENT)
Dept: FAMILY MEDICINE | Facility: CLINIC | Age: 77
End: 2022-12-19
Payer: MEDICARE

## 2022-12-19 NOTE — TELEPHONE ENCOUNTER
Spoke to pt. She states that she recently saw Dr Oliveira with c/o sinus issues. She states that Dr Oliveira ordered Flonase which she has been using once a day. She thinks that now she has a sinus infection and wants to know if something can be called in for her. She is c/o sinus congestion, yellow nasal drainage and headache. I did advise her that normally with these complaints that she would need an appt. She does understand but she is leaving today to go to [facility] for her routine cancer follow up and cannot make an appt. I did advise her that Dr Oliveira is out of the office but I would speak to the NP to see what she advises.

## 2022-12-19 NOTE — TELEPHONE ENCOUNTER
----- Message from Zayda Pina sent at 12/19/2022  8:47 AM CST -----  Regarding: med adv  Type:  Needs Medical Advice    Who Called: Patient   Symptoms (please be specific): sinuses with headache  How long has patient had these symptoms: drainage  Pharmacy name and phone #:  [facility] [address] [facility].  Would the patient rather a call back or a response via [facility]?   Best Call Back Number: [phone]   Additional Information: Patient is leaving for [facility] on today. Please call patient back.

## 2022-12-19 NOTE — TELEPHONE ENCOUNTER
Pt notified of Sera's recommendations. Verbal understanding given. She states that she cannot make an appt today as she is leaving to go to [facility].

## 2023-01-06 DIAGNOSIS — M19.90 OSTEOARTHRITIS, UNSPECIFIED OSTEOARTHRITIS TYPE, UNSPECIFIED SITE: ICD-10-CM

## 2023-01-06 RX ORDER — MELOXICAM 7.5 MG/1
7.5 TABLET ORAL DAILY PRN
Qty: 90 TABLET | Refills: 0 | OUTPATIENT
Start: 2023-01-06

## 2023-02-04 DIAGNOSIS — M25.50 ARTHRALGIA, UNSPECIFIED JOINT: ICD-10-CM

## 2023-02-04 DIAGNOSIS — M79.10 MYALGIA: ICD-10-CM

## 2023-02-06 RX ORDER — MELOXICAM 15 MG/1
TABLET ORAL
Qty: 90 TABLET | Refills: 0 | Status: SHIPPED | OUTPATIENT
Start: 2023-02-06 | End: 2023-05-23

## 2023-02-24 RX ORDER — AMLODIPINE AND BENAZEPRIL HYDROCHLORIDE 10; 20 MG/1; MG/1
1 CAPSULE ORAL DAILY
Qty: 90 CAPSULE | Refills: 3 | Status: SHIPPED | OUTPATIENT
Start: 2023-02-24 | End: 2024-02-07 | Stop reason: SDUPTHER

## 2023-02-24 NOTE — TELEPHONE ENCOUNTER
----- Message from Sylwia Clayton sent at 2/24/2023  9:24 AM CST -----  Regarding: Refill  Type:  RX Refill Request    Who Called: Milla  Refill or New Rx:refill  RX Name and Strength:amlodipine-benazepril 10-20mg (LOTREL) 10-20 mg per capsule  How is the patient currently taking it? (ex. 1XDay):  Is this a 30 day or 90 day RX:  Preferred [facility] with phone number:[facility]  [address] [facility] or Mail Order:  Ordering Provider:  Would the patient rather a call back or a response via [facility]?   Best Call Back Number:      Additional Information: Patient will be going to [facility] in [place] tomorrow and would like the rx early. She will run out of medicine before she gets back to [place].

## 2023-05-23 DIAGNOSIS — M25.50 ARTHRALGIA, UNSPECIFIED JOINT: ICD-10-CM

## 2023-05-23 DIAGNOSIS — M79.10 MYALGIA: ICD-10-CM

## 2023-05-23 RX ORDER — MELOXICAM 15 MG/1
TABLET ORAL
Qty: 90 TABLET | Refills: 0 | Status: SHIPPED | OUTPATIENT
Start: 2023-05-23 | End: 2023-06-13

## 2023-06-12 ENCOUNTER — TELEPHONE (OUTPATIENT)
Dept: FAMILY MEDICINE | Facility: CLINIC | Age: 78
End: 2023-06-12

## 2023-06-12 NOTE — TELEPHONE ENCOUNTER
----- Message from Gita Ordaz sent at 6/12/2023  3:25 PM CDT -----  .Type:  Needs Medical Advice    Who Called: pt  Symptoms (please be specific): dizzy,lighted headed,weak   How long has patient had these symptoms:  few days  Pharmacy name and phone #:    Would the patient rather a call back or a response via [facility]?   Best Call Back Number: [phone]  Additional Information: pt said she can't drive and that's why is couldn't make her appt but wants pcp advice

## 2023-06-12 NOTE — TELEPHONE ENCOUNTER
Patient having dizziness and weakness. Pt drinks water or milk and it gets better. Pt stated she gets real shakey before drinking water or milk. Pt questioning what is wrong with her. Pt also having sinus issues. 146/78 101      142/75 87         139/73   88   115/83    Taking blood pressure every five minutes. Pt stated she feels weak.

## 2023-06-13 ENCOUNTER — OFFICE VISIT (OUTPATIENT)
Dept: FAMILY MEDICINE | Facility: CLINIC | Age: 78
End: 2023-06-13
Payer: MEDICARE

## 2023-06-13 VITALS
HEIGHT: 65 IN | DIASTOLIC BLOOD PRESSURE: 81 MMHG | WEIGHT: 169 LBS | SYSTOLIC BLOOD PRESSURE: 137 MMHG | OXYGEN SATURATION: 98 % | HEART RATE: 81 BPM | BODY MASS INDEX: 28.16 KG/M2 | RESPIRATION RATE: 16 BRPM

## 2023-06-13 DIAGNOSIS — K21.9 GASTROESOPHAGEAL REFLUX DISEASE, UNSPECIFIED WHETHER ESOPHAGITIS PRESENT: ICD-10-CM

## 2023-06-13 DIAGNOSIS — J30.9 ALLERGIC RHINITIS, UNSPECIFIED SEASONALITY, UNSPECIFIED TRIGGER: ICD-10-CM

## 2023-06-13 DIAGNOSIS — R53.1 WEAKNESS: Primary | ICD-10-CM

## 2023-06-13 PROCEDURE — 3288F PR FALLS RISK ASSESSMENT DOCUMENTED: ICD-10-PCS | Mod: CPTII,,, | Performed by: STUDENT IN AN ORGANIZED HEALTH CARE EDUCATION/TRAINING PROGRAM

## 2023-06-13 PROCEDURE — 1101F PR PT FALLS ASSESS DOC 0-1 FALLS W/OUT INJ PAST YR: ICD-10-PCS | Mod: CPTII,,, | Performed by: STUDENT IN AN ORGANIZED HEALTH CARE EDUCATION/TRAINING PROGRAM

## 2023-06-13 PROCEDURE — 3079F DIAST BP 80-89 MM HG: CPT | Mod: CPTII,,, | Performed by: STUDENT IN AN ORGANIZED HEALTH CARE EDUCATION/TRAINING PROGRAM

## 2023-06-13 PROCEDURE — 1159F MED LIST DOCD IN RCRD: CPT | Mod: CPTII,,, | Performed by: STUDENT IN AN ORGANIZED HEALTH CARE EDUCATION/TRAINING PROGRAM

## 2023-06-13 PROCEDURE — 3288F FALL RISK ASSESSMENT DOCD: CPT | Mod: CPTII,,, | Performed by: STUDENT IN AN ORGANIZED HEALTH CARE EDUCATION/TRAINING PROGRAM

## 2023-06-13 PROCEDURE — 3079F PR MOST RECENT DIASTOLIC BLOOD PRESSURE 80-89 MM HG: ICD-10-PCS | Mod: CPTII,,, | Performed by: STUDENT IN AN ORGANIZED HEALTH CARE EDUCATION/TRAINING PROGRAM

## 2023-06-13 PROCEDURE — 99214 OFFICE O/P EST MOD 30 MIN: CPT | Mod: ,,, | Performed by: STUDENT IN AN ORGANIZED HEALTH CARE EDUCATION/TRAINING PROGRAM

## 2023-06-13 PROCEDURE — 99214 PR OFFICE/OUTPT VISIT, EST, LEVL IV, 30-39 MIN: ICD-10-PCS | Mod: ,,, | Performed by: STUDENT IN AN ORGANIZED HEALTH CARE EDUCATION/TRAINING PROGRAM

## 2023-06-13 PROCEDURE — 1101F PT FALLS ASSESS-DOCD LE1/YR: CPT | Mod: CPTII,,, | Performed by: STUDENT IN AN ORGANIZED HEALTH CARE EDUCATION/TRAINING PROGRAM

## 2023-06-13 PROCEDURE — 1159F PR MEDICATION LIST DOCUMENTED IN MEDICAL RECORD: ICD-10-PCS | Mod: CPTII,,, | Performed by: STUDENT IN AN ORGANIZED HEALTH CARE EDUCATION/TRAINING PROGRAM

## 2023-06-13 PROCEDURE — 3075F SYST BP GE 130 - 139MM HG: CPT | Mod: CPTII,,, | Performed by: STUDENT IN AN ORGANIZED HEALTH CARE EDUCATION/TRAINING PROGRAM

## 2023-06-13 PROCEDURE — 3075F PR MOST RECENT SYSTOLIC BLOOD PRESS GE 130-139MM HG: ICD-10-PCS | Mod: CPTII,,, | Performed by: STUDENT IN AN ORGANIZED HEALTH CARE EDUCATION/TRAINING PROGRAM

## 2023-06-13 RX ORDER — PANTOPRAZOLE SODIUM 20 MG/1
20 TABLET, DELAYED RELEASE ORAL DAILY
Qty: 90 TABLET | Refills: 0 | Status: SHIPPED | OUTPATIENT
Start: 2023-06-13 | End: 2023-06-13 | Stop reason: ALTCHOICE

## 2023-06-13 RX ORDER — FLUTICASONE PROPIONATE 50 MCG
2 SPRAY, SUSPENSION (ML) NASAL DAILY
Qty: 16 G | Refills: 3 | Status: SHIPPED | OUTPATIENT
Start: 2023-06-13 | End: 2023-10-30

## 2023-06-13 RX ORDER — FAMOTIDINE 20 MG/1
20 TABLET, FILM COATED ORAL 2 TIMES DAILY
Qty: 180 TABLET | Refills: 0 | Status: SHIPPED | OUTPATIENT
Start: 2023-06-13 | End: 2023-09-11

## 2023-06-13 RX ORDER — MONTELUKAST SODIUM 5 MG/1
5 TABLET, CHEWABLE ORAL NIGHTLY
Qty: 90 TABLET | Refills: 0 | Status: SHIPPED | OUTPATIENT
Start: 2023-06-13 | End: 2023-07-13

## 2023-06-13 NOTE — ASSESSMENT & PLAN NOTE
- Patient says she used to be on Prilosec in the past and doesn't want to be put on the same type of medication.  - Starting Pepcid 20mg BID.  - Re-evaluation in 1 month.

## 2023-06-13 NOTE — PROGRESS NOTES
"  Subjective:      Patient ID: Milla Rider is a 78 y.o.  female.    Chief Complaint: Weakness    Weakness: Onset x 3-4 weeks. She says it's some days, but not every day. Weakness is generalized. Associated symptoms include dizziness and shaking. She thought her BP was contributing to her weakness, but the highest it was at home was 146/78 and . Patient says she swtiched taking her blood pressure medicine to the mornings, and she's been feeling fine with well-controlled BPs. Patient thinks that her Arimidex may be contributing to her symptoms as well. Ever since she had COVID in July 2022, she had myalgias; however, her myalgias are improving.     Allergic Rhinitis: Patient asking for a refill on Flonase and asking if she can take something else other than an anti-histamine.    GERD: Patient says she used to be on Prilosec in the past and doesn't want to be put on the same type of medication.    Review of Systems   Constitutional:  Negative for chills, diaphoresis and fever.   Respiratory:  Negative for shortness of breath.    Cardiovascular:  Negative for chest pain, palpitations and leg swelling.   Gastrointestinal:  Negative for abdominal pain, nausea and vomiting.        +GERD   Musculoskeletal:  Positive for arthralgias and myalgias.   Skin:  Negative for rash and wound.   Neurological:  Positive for dizziness and weakness. Negative for syncope.   Psychiatric/Behavioral:  Positive for sleep disturbance. Negative for dysphoric mood. The patient is not nervous/anxious.      Objective:   /81 (BP Location: Left arm, Patient Position: Sitting)   Pulse 81   Resp 16   Ht 5' 5" (1.651 m)   Wt 76.7 kg (169 lb)   SpO2 98%   BMI 28.12 kg/m²     Physical Exam  Vitals and nursing note reviewed.   Constitutional:       General: She is not in acute distress.     Appearance: Normal appearance. She is not ill-appearing or toxic-appearing.   HENT:      Head: Normocephalic and atraumatic. "   Eyes:      Conjunctiva/sclera: Conjunctivae normal.   Cardiovascular:      Rate and Rhythm: Normal rate and regular rhythm.      Heart sounds: Normal heart sounds. No murmur heard.  Pulmonary:      Effort: Pulmonary effort is normal. No respiratory distress.      Breath sounds: Normal breath sounds.   Abdominal:      General: There is no distension.      Palpations: Abdomen is soft.      Tenderness: There is no abdominal tenderness.   Musculoskeletal:         General: No deformity.      Right lower leg: No edema.      Left lower leg: No edema.   Skin:     General: Skin is warm and dry.      Findings: No lesion or rash.   Neurological:      General: No focal deficit present.      Mental Status: She is alert. Mental status is at baseline.      Motor: No weakness.      Coordination: Coordination normal.   Psychiatric:         Mood and Affect: Mood normal.         Behavior: Behavior normal.         Thought Content: Thought content normal.         Judgment: Judgment normal.     Assessment/Plan:   1. Weakness  Comments:  - Likely multifactorial.  - Continue to monitor.    2. Allergic rhinitis, unspecified seasonality, unspecified trigger  Assessment & Plan:  - Deteriorated.  - Starting Singulair 5mg nightly.  - Continue Flonase.  - Re-evaluation in 1 month.    Orders:  -     fluticasone propionate (FLONASE) 50 mcg/actuation nasal spray; 2 sprays (100 mcg total) by Each Nostril route once daily.  Dispense: 16 g; Refill: 3  -     montelukast (SINGULAIR) 5 MG chewable tablet; Take 1 tablet (5 mg total) by mouth every evening.  Dispense: 90 tablet; Refill: 0    3. Gastroesophageal reflux disease, unspecified whether esophagitis present  Assessment & Plan:  - Patient says she used to be on Prilosec in the past and doesn't want to be put on the same type of medication.  - Starting Pepcid 20mg BID.  - Re-evaluation in 1 month.    Orders:  -     famotidine (PEPCID) 20 MG tablet; Take 1 tablet (20 mg total) by mouth 2 (two)  times daily.  Dispense: 180 tablet; Refill: 0       Follow up in about 1 month (around 7/13/2023) for Allergic Rhinitis/GERD.

## 2023-06-13 NOTE — ASSESSMENT & PLAN NOTE
- Deteriorated.  - Starting Singulair 5mg nightly.  - Continue Flonase.  - Re-evaluation in 1 month.

## 2023-08-17 ENCOUNTER — OFFICE VISIT (OUTPATIENT)
Dept: FAMILY MEDICINE | Facility: CLINIC | Age: 78
End: 2023-08-17
Payer: MEDICARE

## 2023-08-17 VITALS
HEART RATE: 83 BPM | SYSTOLIC BLOOD PRESSURE: 136 MMHG | WEIGHT: 173.19 LBS | RESPIRATION RATE: 20 BRPM | TEMPERATURE: 97 F | DIASTOLIC BLOOD PRESSURE: 78 MMHG | BODY MASS INDEX: 28.86 KG/M2 | OXYGEN SATURATION: 98 % | HEIGHT: 65 IN

## 2023-08-17 DIAGNOSIS — J30.89 NON-SEASONAL ALLERGIC RHINITIS, UNSPECIFIED TRIGGER: Primary | ICD-10-CM

## 2023-08-17 PROCEDURE — 3288F PR FALLS RISK ASSESSMENT DOCUMENTED: ICD-10-PCS | Mod: CPTII,,, | Performed by: NURSE PRACTITIONER

## 2023-08-17 PROCEDURE — 1101F PT FALLS ASSESS-DOCD LE1/YR: CPT | Mod: CPTII,,, | Performed by: NURSE PRACTITIONER

## 2023-08-17 PROCEDURE — 3288F FALL RISK ASSESSMENT DOCD: CPT | Mod: CPTII,,, | Performed by: NURSE PRACTITIONER

## 2023-08-17 PROCEDURE — 3075F SYST BP GE 130 - 139MM HG: CPT | Mod: CPTII,,, | Performed by: NURSE PRACTITIONER

## 2023-08-17 PROCEDURE — 3075F PR MOST RECENT SYSTOLIC BLOOD PRESS GE 130-139MM HG: ICD-10-PCS | Mod: CPTII,,, | Performed by: NURSE PRACTITIONER

## 2023-08-17 PROCEDURE — 1101F PR PT FALLS ASSESS DOC 0-1 FALLS W/OUT INJ PAST YR: ICD-10-PCS | Mod: CPTII,,, | Performed by: NURSE PRACTITIONER

## 2023-08-17 PROCEDURE — 99213 OFFICE O/P EST LOW 20 MIN: CPT | Mod: ,,, | Performed by: NURSE PRACTITIONER

## 2023-08-17 PROCEDURE — 1160F RVW MEDS BY RX/DR IN RCRD: CPT | Mod: CPTII,,, | Performed by: NURSE PRACTITIONER

## 2023-08-17 PROCEDURE — 1159F MED LIST DOCD IN RCRD: CPT | Mod: CPTII,,, | Performed by: NURSE PRACTITIONER

## 2023-08-17 PROCEDURE — 3078F PR MOST RECENT DIASTOLIC BLOOD PRESSURE < 80 MM HG: ICD-10-PCS | Mod: CPTII,,, | Performed by: NURSE PRACTITIONER

## 2023-08-17 PROCEDURE — 1160F PR REVIEW ALL MEDS BY PRESCRIBER/CLIN PHARMACIST DOCUMENTED: ICD-10-PCS | Mod: CPTII,,, | Performed by: NURSE PRACTITIONER

## 2023-08-17 PROCEDURE — 3078F DIAST BP <80 MM HG: CPT | Mod: CPTII,,, | Performed by: NURSE PRACTITIONER

## 2023-08-17 PROCEDURE — 99213 PR OFFICE/OUTPT VISIT, EST, LEVL III, 20-29 MIN: ICD-10-PCS | Mod: ,,, | Performed by: NURSE PRACTITIONER

## 2023-08-17 PROCEDURE — 1126F AMNT PAIN NOTED NONE PRSNT: CPT | Mod: CPTII,,, | Performed by: NURSE PRACTITIONER

## 2023-08-17 PROCEDURE — 1159F PR MEDICATION LIST DOCUMENTED IN MEDICAL RECORD: ICD-10-PCS | Mod: CPTII,,, | Performed by: NURSE PRACTITIONER

## 2023-08-17 PROCEDURE — 1126F PR PAIN SEVERITY QUANTIFIED, NO PAIN PRESENT: ICD-10-PCS | Mod: CPTII,,, | Performed by: NURSE PRACTITIONER

## 2023-08-17 RX ORDER — MONTELUKAST SODIUM 5 MG/1
5 TABLET, CHEWABLE ORAL NIGHTLY
Qty: 30 TABLET | Refills: 5 | Status: SHIPPED | OUTPATIENT
Start: 2023-08-17 | End: 2024-02-21

## 2023-08-17 RX ORDER — MELOXICAM 7.5 MG/1
TABLET ORAL
COMMUNITY
Start: 2022-10-07

## 2023-08-17 NOTE — PROGRESS NOTES
Subjective:      Patient ID: Milla Rider is a 78 y.o. Black or  female      Chief Complaint: Allergic Rhinitis        Past Medical History:   Diagnosis Date    Anemia     Anxiety 6/9/2022    Arthritis     Cancer     Gastroesophageal reflux disease 6/13/2023    Hypertension     Insomnia     Iron deficiency anemia 11/6/2020    Osteoporosis         HPI  Present to clinic for follow up Allergic Rhinitis.    Pt was started on Singulair 5mg nightly and Flonase for complaint of allergy symptoms.  States improved symptoms.  Denies any other problems.             Review of Systems   Constitutional: Negative.  Negative for appetite change, chills and fever.   HENT: Negative.     Eyes: Negative.  Negative for discharge and redness.   Respiratory: Negative.  Negative for shortness of breath.    Cardiovascular: Negative.  Negative for chest pain.   Gastrointestinal: Negative.    Endocrine: Negative.    Genitourinary: Negative.    Integumentary:  Negative.   Allergic/Immunologic: Negative.    Neurological: Negative.    Psychiatric/Behavioral: Negative.     All other systems reviewed and are negative.         Objective:      Vitals:    08/17/23 0840   BP: 136/78   Pulse: 83   Resp: 20   Temp: 97 °F (36.1 °C)      Body mass index is 28.82 kg/m².     Physical Exam  Vitals reviewed.   Constitutional:       Appearance: Normal appearance.   HENT:      Head: Normocephalic.      Nose: No congestion.      Mouth/Throat:      Mouth: Mucous membranes are moist.   Eyes:      Conjunctiva/sclera: Conjunctivae normal.      Pupils: Pupils are equal, round, and reactive to light.   Cardiovascular:      Rate and Rhythm: Normal rate and regular rhythm.   Pulmonary:      Effort: Pulmonary effort is normal. No respiratory distress.      Breath sounds: Normal breath sounds.   Musculoskeletal:         General: Normal range of motion.      Cervical back: Normal range of motion and neck supple.   Skin:     General: Skin is warm and  dry.   Neurological:      Mental Status: She is alert and oriented to person, place, and time.   Psychiatric:         Mood and Affect: Mood normal.            Current Outpatient Medications:     amlodipine-benazepril 10-20mg (LOTREL) 10-20 mg per capsule, Take 1 capsule by mouth once daily., Disp: 90 capsule, Rfl: 3    anastrozole (ARIMIDEX) 1 mg Tab, Take 1 mg by mouth once daily., Disp: , Rfl:     calcium citrate-vitamin D3 200 mg-6.25 mcg (250 unit) Tab, Take 1 tablet by mouth once daily., Disp: , Rfl:     cyclobenzaprine (FLEXERIL) 10 MG tablet, TAKE 1 TABLET BY MOUTH THREE TIMES A DAY AS NEEDED FOR MUSCLE SPASMS, Disp: 90 tablet, Rfl: 0    famotidine (PEPCID) 20 MG tablet, Take 1 tablet (20 mg total) by mouth 2 (two) times daily., Disp: 180 tablet, Rfl: 0    fluticasone propionate (FLONASE) 50 mcg/actuation nasal spray, 2 sprays (100 mcg total) by Each Nostril route once daily., Disp: 16 g, Rfl: 3    meloxicam (MOBIC) 7.5 MG tablet, TAKE 1 TABLET (7.5 MG TOTAL) BY MOUTH DAILY AS NEEDED FOR PAIN. TAKE WITH FOOD. MED FOR ARTHRITIS., Disp: , Rfl:     cholecalciferol, vitamin D3, 1,250 mcg (50,000 unit) Tab, Take 50,000 Units by mouth., Disp: , Rfl:     ferrous sulfate (FEOSOL) 325 mg (65 mg iron) Tab tablet, Take 325 mg by mouth., Disp: , Rfl:     montelukast (SINGULAIR) 5 MG chewable tablet, Take 1 tablet (5 mg total) by mouth every evening., Disp: 30 tablet, Rfl: 5    Assessment & Plan:     Problem List Items Addressed This Visit          ENT    Allergic rhinitis - Primary     Stable  Continue current meds  Keep appt with PCP for follow up

## 2023-10-10 ENCOUNTER — TELEPHONE (OUTPATIENT)
Dept: FAMILY MEDICINE | Facility: CLINIC | Age: 78
End: 2023-10-10
Payer: MEDICARE

## 2023-10-12 ENCOUNTER — TELEPHONE (OUTPATIENT)
Dept: FAMILY MEDICINE | Facility: CLINIC | Age: 78
End: 2023-10-12

## 2023-10-12 ENCOUNTER — OFFICE VISIT (OUTPATIENT)
Dept: FAMILY MEDICINE | Facility: CLINIC | Age: 78
End: 2023-10-12
Payer: MEDICARE

## 2023-10-12 ENCOUNTER — LAB VISIT (OUTPATIENT)
Dept: LAB | Facility: HOSPITAL | Age: 78
End: 2023-10-12
Attending: STUDENT IN AN ORGANIZED HEALTH CARE EDUCATION/TRAINING PROGRAM
Payer: MEDICARE

## 2023-10-12 VITALS
TEMPERATURE: 98 F | OXYGEN SATURATION: 98 % | HEART RATE: 79 BPM | DIASTOLIC BLOOD PRESSURE: 80 MMHG | WEIGHT: 175 LBS | HEIGHT: 65 IN | SYSTOLIC BLOOD PRESSURE: 137 MMHG | RESPIRATION RATE: 18 BRPM | BODY MASS INDEX: 29.16 KG/M2

## 2023-10-12 DIAGNOSIS — J06.9 ACUTE UPPER RESPIRATORY INFECTION, UNSPECIFIED: ICD-10-CM

## 2023-10-12 DIAGNOSIS — B34.9 VIRAL SYNDROME: Primary | ICD-10-CM

## 2023-10-12 DIAGNOSIS — B34.9 VIRAL SYNDROME: ICD-10-CM

## 2023-10-12 LAB
FLUAV AG UPPER RESP QL IA.RAPID: NOT DETECTED
FLUBV AG UPPER RESP QL IA.RAPID: NOT DETECTED
SARS-COV-2 RNA RESP QL NAA+PROBE: NOT DETECTED

## 2023-10-12 PROCEDURE — 3079F DIAST BP 80-89 MM HG: CPT | Mod: CPTII,,, | Performed by: STUDENT IN AN ORGANIZED HEALTH CARE EDUCATION/TRAINING PROGRAM

## 2023-10-12 PROCEDURE — 1101F PR PT FALLS ASSESS DOC 0-1 FALLS W/OUT INJ PAST YR: ICD-10-PCS | Mod: CPTII,,, | Performed by: STUDENT IN AN ORGANIZED HEALTH CARE EDUCATION/TRAINING PROGRAM

## 2023-10-12 PROCEDURE — 3075F PR MOST RECENT SYSTOLIC BLOOD PRESS GE 130-139MM HG: ICD-10-PCS | Mod: CPTII,,, | Performed by: STUDENT IN AN ORGANIZED HEALTH CARE EDUCATION/TRAINING PROGRAM

## 2023-10-12 PROCEDURE — 3075F SYST BP GE 130 - 139MM HG: CPT | Mod: CPTII,,, | Performed by: STUDENT IN AN ORGANIZED HEALTH CARE EDUCATION/TRAINING PROGRAM

## 2023-10-12 PROCEDURE — 1101F PT FALLS ASSESS-DOCD LE1/YR: CPT | Mod: CPTII,,, | Performed by: STUDENT IN AN ORGANIZED HEALTH CARE EDUCATION/TRAINING PROGRAM

## 2023-10-12 PROCEDURE — 99214 PR OFFICE/OUTPT VISIT, EST, LEVL IV, 30-39 MIN: ICD-10-PCS | Mod: ,,, | Performed by: STUDENT IN AN ORGANIZED HEALTH CARE EDUCATION/TRAINING PROGRAM

## 2023-10-12 PROCEDURE — 0240U COVID/FLU A&B PCR: CPT

## 2023-10-12 PROCEDURE — 1125F AMNT PAIN NOTED PAIN PRSNT: CPT | Mod: CPTII,,, | Performed by: STUDENT IN AN ORGANIZED HEALTH CARE EDUCATION/TRAINING PROGRAM

## 2023-10-12 PROCEDURE — 99214 OFFICE O/P EST MOD 30 MIN: CPT | Mod: ,,, | Performed by: STUDENT IN AN ORGANIZED HEALTH CARE EDUCATION/TRAINING PROGRAM

## 2023-10-12 PROCEDURE — 3288F FALL RISK ASSESSMENT DOCD: CPT | Mod: CPTII,,, | Performed by: STUDENT IN AN ORGANIZED HEALTH CARE EDUCATION/TRAINING PROGRAM

## 2023-10-12 PROCEDURE — 1159F MED LIST DOCD IN RCRD: CPT | Mod: CPTII,,, | Performed by: STUDENT IN AN ORGANIZED HEALTH CARE EDUCATION/TRAINING PROGRAM

## 2023-10-12 PROCEDURE — 3079F PR MOST RECENT DIASTOLIC BLOOD PRESSURE 80-89 MM HG: ICD-10-PCS | Mod: CPTII,,, | Performed by: STUDENT IN AN ORGANIZED HEALTH CARE EDUCATION/TRAINING PROGRAM

## 2023-10-12 PROCEDURE — 1125F PR PAIN SEVERITY QUANTIFIED, PAIN PRESENT: ICD-10-PCS | Mod: CPTII,,, | Performed by: STUDENT IN AN ORGANIZED HEALTH CARE EDUCATION/TRAINING PROGRAM

## 2023-10-12 PROCEDURE — 3288F PR FALLS RISK ASSESSMENT DOCUMENTED: ICD-10-PCS | Mod: CPTII,,, | Performed by: STUDENT IN AN ORGANIZED HEALTH CARE EDUCATION/TRAINING PROGRAM

## 2023-10-12 PROCEDURE — 1159F PR MEDICATION LIST DOCUMENTED IN MEDICAL RECORD: ICD-10-PCS | Mod: CPTII,,, | Performed by: STUDENT IN AN ORGANIZED HEALTH CARE EDUCATION/TRAINING PROGRAM

## 2023-10-12 RX ORDER — AZITHROMYCIN 250 MG/1
TABLET, FILM COATED ORAL
Qty: 6 TABLET | Refills: 0 | Status: SHIPPED | OUTPATIENT
Start: 2023-10-12 | End: 2023-10-17

## 2023-10-12 NOTE — PROGRESS NOTES
"Subjective:      Patient ID: Milla Rider is a 78 y.o.  female.    Chief Complaint: Sick Visit    Sick Visit: Onset x 1 month ago. Associated symptoms include chills, headaches, and myalgias. Patient states she takes a little bit of Vistaril that helps. She has been using a nasal spray as well. She denies any sick contacts.    Review of Systems   Constitutional:  Positive for chills. Negative for fever.   HENT:  Positive for congestion and ear pain. Negative for sore throat and tinnitus.    Respiratory:  Positive for shortness of breath. Negative for cough and wheezing.    Cardiovascular:  Positive for palpitations. Negative for chest pain.   Gastrointestinal:  Negative for abdominal pain, blood in stool, diarrhea, nausea and vomiting.   Genitourinary:  Negative for dysuria and hematuria.   Skin:  Negative for rash.   Neurological:  Positive for weakness and headaches.     Objective:   /80 (BP Location: Left arm, Patient Position: Sitting, BP Method: Medium (Automatic))   Pulse 79   Temp 98.1 °F (36.7 °C) (Oral)   Resp 18   Ht 5' 5" (1.651 m)   Wt 79.4 kg (175 lb)   SpO2 98%   BMI 29.12 kg/m²     Physical Exam  Vitals and nursing note reviewed.   Constitutional:       General: She is not in acute distress.     Appearance: Normal appearance. She is not ill-appearing, toxic-appearing or diaphoretic.   HENT:      Head: Normocephalic and atraumatic.      Right Ear: Ear canal and external ear normal. There is impacted cerumen.      Left Ear: Ear canal and external ear normal. There is impacted cerumen.      Nose:      Comments: Hyperemic and swollen nares bilaterally.     Mouth/Throat:      Mouth: Mucous membranes are moist.      Pharynx: Oropharynx is clear. No oropharyngeal exudate or posterior oropharyngeal erythema.      Comments: Mucosal secretions at posterior oropharynx.  Eyes:      Conjunctiva/sclera: Conjunctivae normal.   Cardiovascular:      Rate and Rhythm: Normal rate and " regular rhythm.      Heart sounds: Normal heart sounds. No murmur heard.  Pulmonary:      Effort: Pulmonary effort is normal. No respiratory distress.      Breath sounds: Normal breath sounds.   Abdominal:      General: There is no distension.      Palpations: Abdomen is soft.      Tenderness: There is no abdominal tenderness.   Musculoskeletal:         General: No deformity.      Cervical back: Neck supple. No tenderness.      Right lower leg: No edema.      Left lower leg: No edema.   Lymphadenopathy:      Cervical: No cervical adenopathy.   Skin:     General: Skin is warm and dry.      Findings: No lesion or rash.   Neurological:      General: No focal deficit present.      Mental Status: She is alert. Mental status is at baseline.      Motor: No weakness.      Coordination: Coordination normal.   Psychiatric:         Mood and Affect: Mood normal.         Behavior: Behavior normal.         Thought Content: Thought content normal.         Judgment: Judgment normal.       Assessment/Plan:   1. Viral syndrome  Comments:  - Continue symptomatic treatment.  - Recommend Debrox OTC for ear eax.  - If COVID/FLU negative, will prescribe Z-Mateo.  Orders:  -     COVID/FLU A&B PCR; Future; Expected date: 10/12/2023    2. Acute upper respiratory infection, unspecified  -     COVID/FLU A&B PCR; Future; Expected date: 10/12/2023       After patient went to the lab for COVID/FLU swab, the was complaining of chest tightness and instructed to present to the ER for further evaluation.

## 2023-10-12 NOTE — TELEPHONE ENCOUNTER
----- Message from Jennifer Oliveira DO sent at 10/12/2023 11:43 AM CDT -----  COVID and flu negative. Will prescribe Z-kellie for patient.

## 2023-10-30 DIAGNOSIS — J30.9 ALLERGIC RHINITIS, UNSPECIFIED SEASONALITY, UNSPECIFIED TRIGGER: ICD-10-CM

## 2023-10-30 RX ORDER — FLUTICASONE PROPIONATE 50 MCG
2 SPRAY, SUSPENSION (ML) NASAL
Qty: 16 ML | Refills: 3 | Status: SHIPPED | OUTPATIENT
Start: 2023-10-30 | End: 2024-02-07 | Stop reason: SDUPTHER

## 2024-01-29 ENCOUNTER — OFFICE VISIT (OUTPATIENT)
Dept: FAMILY MEDICINE | Facility: CLINIC | Age: 79
End: 2024-01-29
Payer: MEDICARE

## 2024-01-29 VITALS
OXYGEN SATURATION: 99 % | HEART RATE: 71 BPM | RESPIRATION RATE: 16 BRPM | TEMPERATURE: 98 F | BODY MASS INDEX: 28.32 KG/M2 | SYSTOLIC BLOOD PRESSURE: 138 MMHG | HEIGHT: 65 IN | DIASTOLIC BLOOD PRESSURE: 76 MMHG | WEIGHT: 170 LBS

## 2024-01-29 DIAGNOSIS — C50.911 MUCINOUS CARCINOMA OF RIGHT BREAST: ICD-10-CM

## 2024-01-29 DIAGNOSIS — F41.9 ANXIETY: ICD-10-CM

## 2024-01-29 DIAGNOSIS — G47.00 INSOMNIA, UNSPECIFIED TYPE: ICD-10-CM

## 2024-01-29 DIAGNOSIS — M81.0 OSTEOPOROSIS, UNSPECIFIED OSTEOPOROSIS TYPE, UNSPECIFIED PATHOLOGICAL FRACTURE PRESENCE: ICD-10-CM

## 2024-01-29 DIAGNOSIS — J30.9 ALLERGIC RHINITIS, UNSPECIFIED SEASONALITY, UNSPECIFIED TRIGGER: ICD-10-CM

## 2024-01-29 DIAGNOSIS — K21.9 GASTROESOPHAGEAL REFLUX DISEASE, UNSPECIFIED WHETHER ESOPHAGITIS PRESENT: ICD-10-CM

## 2024-01-29 DIAGNOSIS — Z13.220 NEED FOR LIPID SCREENING: ICD-10-CM

## 2024-01-29 DIAGNOSIS — L29.9 PRURITUS: ICD-10-CM

## 2024-01-29 DIAGNOSIS — D50.9 IRON DEFICIENCY ANEMIA, UNSPECIFIED IRON DEFICIENCY ANEMIA TYPE: ICD-10-CM

## 2024-01-29 DIAGNOSIS — Z00.00 MEDICARE ANNUAL WELLNESS VISIT, SUBSEQUENT: Primary | ICD-10-CM

## 2024-01-29 DIAGNOSIS — R79.9 ABNORMAL FINDING OF BLOOD CHEMISTRY, UNSPECIFIED: ICD-10-CM

## 2024-01-29 DIAGNOSIS — I10 PRIMARY HYPERTENSION: ICD-10-CM

## 2024-01-29 DIAGNOSIS — Z23 NEEDS FLU SHOT: ICD-10-CM

## 2024-01-29 DIAGNOSIS — R00.2 PALPITATIONS: ICD-10-CM

## 2024-01-29 PROCEDURE — G0439 PPPS, SUBSEQ VISIT: HCPCS | Mod: ,,, | Performed by: STUDENT IN AN ORGANIZED HEALTH CARE EDUCATION/TRAINING PROGRAM

## 2024-01-29 PROCEDURE — 1101F PT FALLS ASSESS-DOCD LE1/YR: CPT | Mod: CPTII,,, | Performed by: STUDENT IN AN ORGANIZED HEALTH CARE EDUCATION/TRAINING PROGRAM

## 2024-01-29 PROCEDURE — 90694 VACC AIIV4 NO PRSRV 0.5ML IM: CPT | Mod: ,,, | Performed by: STUDENT IN AN ORGANIZED HEALTH CARE EDUCATION/TRAINING PROGRAM

## 2024-01-29 PROCEDURE — 1125F AMNT PAIN NOTED PAIN PRSNT: CPT | Mod: CPTII,,, | Performed by: STUDENT IN AN ORGANIZED HEALTH CARE EDUCATION/TRAINING PROGRAM

## 2024-01-29 PROCEDURE — 3075F SYST BP GE 130 - 139MM HG: CPT | Mod: CPTII,,, | Performed by: STUDENT IN AN ORGANIZED HEALTH CARE EDUCATION/TRAINING PROGRAM

## 2024-01-29 PROCEDURE — 3288F FALL RISK ASSESSMENT DOCD: CPT | Mod: CPTII,,, | Performed by: STUDENT IN AN ORGANIZED HEALTH CARE EDUCATION/TRAINING PROGRAM

## 2024-01-29 PROCEDURE — G0008 ADMIN INFLUENZA VIRUS VAC: HCPCS | Mod: ,,, | Performed by: STUDENT IN AN ORGANIZED HEALTH CARE EDUCATION/TRAINING PROGRAM

## 2024-01-29 PROCEDURE — 3078F DIAST BP <80 MM HG: CPT | Mod: CPTII,,, | Performed by: STUDENT IN AN ORGANIZED HEALTH CARE EDUCATION/TRAINING PROGRAM

## 2024-01-29 PROCEDURE — 1159F MED LIST DOCD IN RCRD: CPT | Mod: CPTII,,, | Performed by: STUDENT IN AN ORGANIZED HEALTH CARE EDUCATION/TRAINING PROGRAM

## 2024-01-29 NOTE — ASSESSMENT & PLAN NOTE
- Stable.  - Patient following with Dr. Geovanna Soliz with Hematology in [place], [place].  - Patient states she stopped taking iron supplementation last year because she was told she didn't need to take it anymore.

## 2024-01-29 NOTE — PROGRESS NOTES
Subjective:      Patient ID: Milla Rider is a 79 y.o.  female.    Chief Complaint: Medicare Wellness    Preventative Health: Patient amenable to flu vaccine. Patient had a hysterectomy unrelated to CA.     Mucinous Carcinoma of Right Breast: Patient following with [facility] in [place], [place]. Patient following with Dr. Long Mathis with Oncology. She was diagnosed in October 2020. Patient had right breast mastectomy and 43 lymph nodes removed surrounding right breast on 11/16/20. Patient is taking anastrazole for a total of 5 years.     KAREN: Patient following with Dr. Geovanna Soliz with Hematology in [place], [place]. Patient states she stopped taking iron supplementation last year because she was told she didn't need to take it anymore.    HTN: /76. Patient states compliance with Lotrel.     Insomnia/Anxiety/Pruritis: Patient stopped taking Vistaril in the past due to side effects. She said her itching isn't as bad. She still has insomnia and anxiety.    Osteoporosis: Patient following with Dr. Tiffanie Piper with Endocrinology in [place], [place]. Patient taking vitamin D.    Allergic Rhinitis: Patient amenable to ENT referral.    Palpitations/Shortness of Breath: Onset x 2 weeks. She said she's having it every day. She thinks it may be related to her anastrozole. However, the timing is variable.    Review of Systems   Constitutional:  Negative for activity change, appetite change, chills, diaphoresis, fatigue, fever and unexpected weight change.   Eyes:  Negative for visual disturbance.   Respiratory:  Positive for shortness of breath. Negative for cough, wheezing and stridor.    Cardiovascular:  Positive for palpitations. Negative for chest pain and leg swelling.   Gastrointestinal:  Negative for abdominal pain, blood in stool, constipation, diarrhea, nausea and vomiting.   Genitourinary:  Negative for dysuria and hematuria.   Musculoskeletal:  Positive for arthralgias and myalgias.   Skin:  Negative  "for rash and wound.   Allergic/Immunologic: Positive for environmental allergies.   Neurological:  Negative for dizziness, syncope, weakness, numbness and headaches.   Psychiatric/Behavioral:  Positive for sleep disturbance. Negative for confusion, dysphoric mood, hallucinations, self-injury and suicidal ideas. The patient is nervous/anxious.      Objective:   /76 (BP Location: Left arm)   Pulse 71   Temp 97.9 °F (36.6 °C) (Temporal)   Resp 16   Ht 5' 5" (1.651 m)   Wt 77.1 kg (170 lb)   SpO2 99%   BMI 28.29 kg/m²     Physical Exam  Vitals and nursing note reviewed.   Constitutional:       General: She is not in acute distress.     Appearance: Normal appearance. She is not ill-appearing or toxic-appearing.   HENT:      Head: Normocephalic and atraumatic.      Mouth/Throat:      Mouth: Mucous membranes are moist.      Pharynx: Oropharynx is clear. No oropharyngeal exudate or posterior oropharyngeal erythema.      Comments: Mucosal secretions at posterior oropharynx.  Eyes:      Conjunctiva/sclera: Conjunctivae normal.   Cardiovascular:      Rate and Rhythm: Normal rate and regular rhythm.      Heart sounds: Normal heart sounds. No murmur heard.  Pulmonary:      Effort: Pulmonary effort is normal. No respiratory distress.      Breath sounds: Normal breath sounds.   Abdominal:      General: There is no distension.      Palpations: Abdomen is soft.      Tenderness: There is no abdominal tenderness.   Musculoskeletal:         General: No deformity.      Cervical back: Neck supple. No tenderness.      Right lower leg: No edema.      Left lower leg: No edema.   Lymphadenopathy:      Cervical: No cervical adenopathy.   Skin:     General: Skin is warm and dry.      Findings: No lesion or rash.   Neurological:      General: No focal deficit present.      Mental Status: She is alert. Mental status is at baseline.      Motor: No weakness.      Coordination: Coordination normal.   Psychiatric:         Mood and " Affect: Mood normal.         Behavior: Behavior normal.         Thought Content: Thought content normal.         Judgment: Judgment normal.       Assessment/Plan:   1. Medicare annual wellness visit, subsequent  Comments:  - Health maintenance reviewed.  Orders:  -     Hemoglobin A1C; Future; Expected date: 01/29/2024  -     Cancel: Basic Metabolic Panel; Future; Expected date: 01/29/2024  -     Lipid Panel; Future; Expected date: 01/29/2024  -     Comprehensive Metabolic Panel; Future; Expected date: 01/29/2024    2. Needs flu shot  -     Influenza (FLUAD) - Quadrivalent (Adjuvanted) *Preferred* (65+) (PF)    3. Need for lipid screening  -     Lipid Panel; Future; Expected date: 01/29/2024    4. Abnormal finding of blood chemistry, unspecified  -     Hemoglobin A1C; Future; Expected date: 01/29/2024    5. Mucinous carcinoma of right breast  Assessment & Plan:  - Patient following with [facility] in [place], [place]. Patient following with Dr. Long Mathis with Oncology. She sees Oncology every 6 months.  - She was diagnosed in October 2020.  - Patient had right breast mastectomy and 43 lymph nodes removed surrounding right breast on 11/16/20.  - Patient is taking anastrazole for a total of 5 years. Anastrazole per Oncology.        6. Iron deficiency anemia, unspecified iron deficiency anemia type  Assessment & Plan:  - Stable.  - Patient following with Dr. Geovanna Soliz with Hematology in [place], [place].  - Patient states she stopped taking iron supplementation last year because she was told she didn't need to take it anymore.      7. Primary hypertension  Assessment & Plan:  - BP well-controlled.  - Continue Amlodipine-Benazepril 10mg-20mg daily.        8. Insomnia, unspecified type  Assessment & Plan:  - Stable.  - Patient stopped taking Vistaril in the past due to side effects.      9. Anxiety  Assessment & Plan:  - Stable.  - Patient stopped taking Vistaril in the past due to side effects.      10. Pruritus  Assessment &  Plan:  - Stable.  - Patient stopped taking Vistaril in the past due to side effects.      11. Osteoporosis, unspecified osteoporosis type, unspecified pathological fracture presence  Assessment & Plan:  - Patient following with Dr. Tiffanie Piper with Endocrinology in [place], [place].  - Patient taking vitamin D supplementation daily.      12. Allergic rhinitis, unspecified seasonality, unspecified trigger  Assessment & Plan:  - Stable.  - Continue Flonase and Singulair.    Orders:  -     Ambulatory referral/consult to ENT; Future; Expected date: 02/05/2024    13. Gastroesophageal reflux disease, unspecified whether esophagitis present  Assessment & Plan:  - Stable.  - Patient hasn't been taking Pepcid.      14. Palpitations  -     Ambulatory referral/consult to Cardiology; Future; Expected date: 02/05/2024  -     Comprehensive Metabolic Panel; Future; Expected date: 01/29/2024       - Patient educated regarding red flag symptoms to present to the ER for further evaluation.    Opioid Screening: Patient medication list reviewed, patient is not taking prescription opioids. Patient is not using additional opioids than prescribed. Patient is at low risk of substance abuse based on this opioid use history.     Patient Reported Health Risk Assessment  What [age] 70-79  Are you male or female?: Female  During the past four weeks, how much have you been bothered by emotional problems such as feeling anxious, depressed, irritable, sad, or downhearted and blue?: Not at all  During the past five weeks, has your physical and/or emotional health limited your social activities with family, friends, neighbors, or groups?: Not at all  During the past four weeks, how much bodily pain have you generally had?: Moderate pain  During the past four weeks, was someone available to help if you needed and wanted help?: Yes, as much as I wanted  During the past four weeks, what was the hardest physical activity you could do for at least  two minutes?: Light  Can you get to places out of walking distance without help?  (For example, can you travel alone on buses or taxis, or drive your own car?): Yes  Can you go shopping for groceries or clothes without someone's help?: Yes  Can you prepare your own meals?: Yes  Can you do your own housework without help?: Yes  Because of any health problems, do you need the help of another person with your personal care needs such as eating, bathing, dressing, or getting around the house?: No  Can you handle your own money without help?: Yes  During the past four weeks, how would you rate your health in general?: Fair  How have things been going for you during the past four weeks?: Good and bad parts about equal  Are you having difficulties driving your car?: No  Do you always fasten your seat belt when you are in a car?: Yes, usually  How often in the past four weeks have you been bothered by falling or dizzy when standing up?: Never  How often in the past four weeks have you been bothered by sexual problems?: Never  How often in the past four weeks have you been bothered by trouble eating well?: Never  How often in the past four weeks have you been bothered by teeth or denture problems?: Never  How often in the past four weeks have you been bothered with problems using the telephone?: Never  How often in the past four weeks have you been bothered by tiredness or fatigue?: Never  Have you fallen two or more times in the past year?: No  Are you afraid of falling?: No  Are you a smoker?: No  During the past four weeks, how many drinks of wine, beer, or other alcoholic beverages did you have?: No alcohol at all  Do you exercise for about 20 minutes three or more days a week?: Yes, some of the time  Have you been given any information to help you with hazards in your house that might hurt you?: No  Have you been given any information to help you with keeping track of your medications?: No  How often do you have trouble  taking medicines the way you've been told to take them?: I always take them as prescribed  How confident are you that you can control and manage most of your health problems?: Very confident  What is your race? (Check all that apply.):     Medicare Annual Wellness and Personalized Prevention Plan:   Fall Risk + Home Safety + Hearing Impairment + Depression Screen + Opioid and Substance Abuse Screening + Cognitive Impairment Screen + Health Risk Assessment all reviewed.     Advance Care Planning   I attest to discussing Advance Care Planning with patient and/or family member.  Education was provided including the importance of the Health Care Power of , Advance Directives, and/or [place] documentation.  The patient expressed understanding to the importance of this information and discussion.       Follow up in about 6 months (around 7/29/2024) for Chronic Medical Management.

## 2024-01-29 NOTE — ASSESSMENT & PLAN NOTE
- Patient following with Dr. Tiffanie Piper with Endocrinology in [place], [place].  - Patient taking vitamin D supplementation daily.

## 2024-01-29 NOTE — ASSESSMENT & PLAN NOTE
- Patient following with [facility] in [place], [place]. Patient following with Dr. Long Mathis with Oncology. She sees Oncology every 6 months.  - She was diagnosed in October 2020.  - Patient had right breast mastectomy and 43 lymph nodes removed surrounding right breast on 11/16/20.  - Patient is taking anastrazole for a total of 5 years. Anastrazole per Oncology.

## 2024-02-07 DIAGNOSIS — J30.9 ALLERGIC RHINITIS, UNSPECIFIED SEASONALITY, UNSPECIFIED TRIGGER: ICD-10-CM

## 2024-02-07 RX ORDER — AMLODIPINE AND BENAZEPRIL HYDROCHLORIDE 10; 20 MG/1; MG/1
1 CAPSULE ORAL DAILY
Qty: 90 CAPSULE | Refills: 3 | Status: SHIPPED | OUTPATIENT
Start: 2024-02-07

## 2024-02-07 RX ORDER — FLUTICASONE PROPIONATE 50 MCG
2 SPRAY, SUSPENSION (ML) NASAL DAILY
Qty: 16 ML | Refills: 3 | Status: SHIPPED | OUTPATIENT
Start: 2024-02-07 | End: 2024-05-09

## 2024-02-07 NOTE — TELEPHONE ENCOUNTER
----- Message from Sylwia Forrest sent at 2/7/2024  9:20 AM CST -----  Regarding: refill  Type:  RX Refill Request    Who Called: Milla    Refill or New Rx:refill    RX Name and Strength:  fluticasone propionate (FLONASE) 50 mcg/actuation nasal spray    amlodipine-benazepril 10-20mg (LOTREL) 10-20 mg per capsule    How is the patient currently taking it? (ex. 1XDay):    Is this a 30 day or 90 day RX:    Preferred [facility] with phone number:[facility]    Local or Mail Order:    Ordering Provider:    Would the patient rather a call back or a response via [facility]?     Best Call Back Number:    Additional Information:

## 2024-02-21 ENCOUNTER — LAB VISIT (OUTPATIENT)
Dept: LAB | Facility: HOSPITAL | Age: 79
End: 2024-02-21
Attending: STUDENT IN AN ORGANIZED HEALTH CARE EDUCATION/TRAINING PROGRAM
Payer: MEDICARE

## 2024-02-21 DIAGNOSIS — J30.89 NON-SEASONAL ALLERGIC RHINITIS, UNSPECIFIED TRIGGER: Primary | ICD-10-CM

## 2024-02-21 DIAGNOSIS — Z00.00 MEDICARE ANNUAL WELLNESS VISIT, SUBSEQUENT: ICD-10-CM

## 2024-02-21 DIAGNOSIS — R00.2 PALPITATIONS: ICD-10-CM

## 2024-02-21 DIAGNOSIS — R79.9 ABNORMAL FINDING OF BLOOD CHEMISTRY, UNSPECIFIED: ICD-10-CM

## 2024-02-21 DIAGNOSIS — R77.9 ELEVATED BLOOD PROTEIN: Primary | ICD-10-CM

## 2024-02-21 DIAGNOSIS — Z13.220 NEED FOR LIPID SCREENING: ICD-10-CM

## 2024-02-21 DIAGNOSIS — R77.1 ELEVATED SERUM GLOBULIN LEVEL: ICD-10-CM

## 2024-02-21 LAB
ALBUMIN SERPL-MCNC: 4.2 G/DL (ref 3.4–4.8)
ALBUMIN/GLOB SERPL: 1 RATIO (ref 1.1–2)
ALP SERPL-CCNC: 104 UNIT/L (ref 40–150)
ALT SERPL-CCNC: 18 UNIT/L (ref 0–55)
AST SERPL-CCNC: 21 UNIT/L (ref 5–34)
BILIRUB SERPL-MCNC: 0.5 MG/DL
BUN SERPL-MCNC: 17.1 MG/DL (ref 9.8–20.1)
CALCIUM SERPL-MCNC: 10 MG/DL (ref 8.4–10.2)
CHLORIDE SERPL-SCNC: 104 MMOL/L (ref 98–107)
CHOLEST SERPL-MCNC: 205 MG/DL
CHOLEST/HDLC SERPL: 3 {RATIO} (ref 0–5)
CO2 SERPL-SCNC: 29 MMOL/L (ref 23–31)
CREAT SERPL-MCNC: 0.78 MG/DL (ref 0.55–1.02)
EST. AVERAGE GLUCOSE BLD GHB EST-MCNC: 102.5 MG/DL
GFR SERPLBLD CREATININE-BSD FMLA CKD-EPI: >60 MLS/MIN/1.73/M2
GLOBULIN SER-MCNC: 4.4 GM/DL (ref 2.4–3.5)
GLUCOSE SERPL-MCNC: 104 MG/DL (ref 82–115)
HBA1C MFR BLD: 5.2 %
HDLC SERPL-MCNC: 68 MG/DL (ref 35–60)
LDLC SERPL CALC-MCNC: 123 MG/DL (ref 50–140)
POTASSIUM SERPL-SCNC: 3.9 MMOL/L (ref 3.5–5.1)
PROT SERPL-MCNC: 8.6 GM/DL (ref 5.8–7.6)
SODIUM SERPL-SCNC: 142 MMOL/L (ref 136–145)
TRIGL SERPL-MCNC: 72 MG/DL (ref 37–140)
VLDLC SERPL CALC-MCNC: 14 MG/DL

## 2024-02-21 PROCEDURE — 80053 COMPREHEN METABOLIC PANEL: CPT

## 2024-02-21 PROCEDURE — 36415 COLL VENOUS BLD VENIPUNCTURE: CPT

## 2024-02-21 PROCEDURE — 80061 LIPID PANEL: CPT

## 2024-02-21 PROCEDURE — 83036 HEMOGLOBIN GLYCOSYLATED A1C: CPT

## 2024-02-21 RX ORDER — MONTELUKAST SODIUM 5 MG/1
5 TABLET, CHEWABLE ORAL NIGHTLY
Qty: 90 TABLET | Refills: 3 | Status: SHIPPED | OUTPATIENT
Start: 2024-02-21

## 2024-03-11 ENCOUNTER — TELEPHONE (OUTPATIENT)
Dept: FAMILY MEDICINE | Facility: CLINIC | Age: 79
End: 2024-03-11
Payer: MEDICARE

## 2024-03-11 NOTE — TELEPHONE ENCOUNTER
----- Message from Sylwia Clayton sent at 3/11/2024  3:27 PM CDT -----  Regarding: medical advice  [phone]  Type:  Needs Medical Advice    Who Called: Milla  Symptoms (please be specific): UTI   How long has patient had these symptoms:    Pharmacy name and phone #:    Would the patient rather a call back or a response via [facility]?   Best Call Back Number: [phone]  Additional Information:

## 2024-03-14 ENCOUNTER — TELEPHONE (OUTPATIENT)
Dept: FAMILY MEDICINE | Facility: CLINIC | Age: 79
End: 2024-03-14
Payer: MEDICARE

## 2024-03-14 NOTE — TELEPHONE ENCOUNTER
----- Message from Gail Nava sent at 3/14/2024 10:31 AM CDT -----  Regarding: missed appt  .Type:  Needs Medical Advice    Who Called: pt    Symptoms (please be specific):      How long has patient had these symptoms:      Pharmacy name and phone #:      Would the patient rather a call back or a response via [facility]? CB if needed.   Best Call Back Number:     Additional Information: pt requested that I notate in her chart that she called after trying to cancel appt that was scheduled for yesterday; thanks.

## 2024-03-14 NOTE — LETTER
AUTHORIZATION FOR RELEASE OF   CONFIDENTIAL INFORMATION    Dear Whom It May Concern,    We are seeing Milla Rider, date of birth 1945, in the clinic at [facility]. Jennifer Oliveira DO is the patient's PCP. Milla Rider has an outstanding lab/procedure at the time we reviewed her chart. In order to help keep her health information updated, she has authorized us to request the following medical record(s):        (  )  MAMMOGRAM                                      (  )  COLONOSCOPY      (  )  PAP SMEAR                                          (  )  OUTSIDE LAB RESULTS     (  )  DEXA SCAN                                          (  )  EYE EXAM            (  )  FOOT EXAM                                          ( X )  ENTIRE RECORD     (  )  OUTSIDE IMMUNIZATIONS                 (  )  _______________         Please fax records to Jennifer Oliveira DO, [phone]     If you have any questions, please contact Jane Sheehan MA at [phone].           Patient Name: Milla Rider  : 1945  Patient Phone #: [phone]

## 2024-05-09 DIAGNOSIS — J30.9 ALLERGIC RHINITIS, UNSPECIFIED SEASONALITY, UNSPECIFIED TRIGGER: Primary | ICD-10-CM

## 2024-05-09 RX ORDER — FLUTICASONE PROPIONATE 50 MCG
2 SPRAY, SUSPENSION (ML) NASAL DAILY
Qty: 48 ML | Refills: 3 | Status: SHIPPED | OUTPATIENT
Start: 2024-05-09

## 2024-07-30 ENCOUNTER — OFFICE VISIT (OUTPATIENT)
Dept: FAMILY MEDICINE | Facility: CLINIC | Age: 79
End: 2024-07-30
Payer: MEDICARE

## 2024-07-30 VITALS
DIASTOLIC BLOOD PRESSURE: 70 MMHG | WEIGHT: 172.81 LBS | HEART RATE: 69 BPM | SYSTOLIC BLOOD PRESSURE: 124 MMHG | OXYGEN SATURATION: 99 % | HEIGHT: 65 IN | BODY MASS INDEX: 28.79 KG/M2 | RESPIRATION RATE: 16 BRPM | TEMPERATURE: 97 F

## 2024-07-30 DIAGNOSIS — M81.0 OSTEOPOROSIS, UNSPECIFIED OSTEOPOROSIS TYPE, UNSPECIFIED PATHOLOGICAL FRACTURE PRESENCE: ICD-10-CM

## 2024-07-30 DIAGNOSIS — I10 PRIMARY HYPERTENSION: ICD-10-CM

## 2024-07-30 DIAGNOSIS — G47.00 INSOMNIA, UNSPECIFIED TYPE: ICD-10-CM

## 2024-07-30 DIAGNOSIS — K21.9 GASTROESOPHAGEAL REFLUX DISEASE, UNSPECIFIED WHETHER ESOPHAGITIS PRESENT: ICD-10-CM

## 2024-07-30 DIAGNOSIS — F41.9 ANXIETY: ICD-10-CM

## 2024-07-30 DIAGNOSIS — C50.911 MUCINOUS CARCINOMA OF RIGHT BREAST: Primary | ICD-10-CM

## 2024-07-30 DIAGNOSIS — L29.9 PRURITUS: ICD-10-CM

## 2024-07-30 DIAGNOSIS — J30.89 NON-SEASONAL ALLERGIC RHINITIS, UNSPECIFIED TRIGGER: ICD-10-CM

## 2024-07-30 DIAGNOSIS — D50.9 IRON DEFICIENCY ANEMIA, UNSPECIFIED IRON DEFICIENCY ANEMIA TYPE: ICD-10-CM

## 2024-07-30 PROCEDURE — 1126F AMNT PAIN NOTED NONE PRSNT: CPT | Mod: CPTII,,, | Performed by: STUDENT IN AN ORGANIZED HEALTH CARE EDUCATION/TRAINING PROGRAM

## 2024-07-30 PROCEDURE — 1101F PT FALLS ASSESS-DOCD LE1/YR: CPT | Mod: CPTII,,, | Performed by: STUDENT IN AN ORGANIZED HEALTH CARE EDUCATION/TRAINING PROGRAM

## 2024-07-30 PROCEDURE — 3074F SYST BP LT 130 MM HG: CPT | Mod: CPTII,,, | Performed by: STUDENT IN AN ORGANIZED HEALTH CARE EDUCATION/TRAINING PROGRAM

## 2024-07-30 PROCEDURE — 3078F DIAST BP <80 MM HG: CPT | Mod: CPTII,,, | Performed by: STUDENT IN AN ORGANIZED HEALTH CARE EDUCATION/TRAINING PROGRAM

## 2024-07-30 PROCEDURE — 3288F FALL RISK ASSESSMENT DOCD: CPT | Mod: CPTII,,, | Performed by: STUDENT IN AN ORGANIZED HEALTH CARE EDUCATION/TRAINING PROGRAM

## 2024-07-30 PROCEDURE — 99214 OFFICE O/P EST MOD 30 MIN: CPT | Mod: ,,, | Performed by: STUDENT IN AN ORGANIZED HEALTH CARE EDUCATION/TRAINING PROGRAM

## 2024-07-30 PROCEDURE — 1159F MED LIST DOCD IN RCRD: CPT | Mod: CPTII,,, | Performed by: STUDENT IN AN ORGANIZED HEALTH CARE EDUCATION/TRAINING PROGRAM

## 2024-07-30 RX ORDER — MIRTAZAPINE 7.5 MG/1
7.5 TABLET, FILM COATED ORAL NIGHTLY
Qty: 90 TABLET | Refills: 0 | Status: SHIPPED | OUTPATIENT
Start: 2024-07-30 | End: 2025-07-30

## 2024-07-30 RX ORDER — PANTOPRAZOLE SODIUM 20 MG/1
20 TABLET, DELAYED RELEASE ORAL DAILY
Qty: 90 TABLET | Refills: 0 | Status: SHIPPED | OUTPATIENT
Start: 2024-07-30 | End: 2025-07-30

## 2024-07-30 NOTE — ASSESSMENT & PLAN NOTE
- Patient following with Dr. Tiffanie Piper with Endocrinology in [place], [place].  - Patient taking calcium + vitamin D supplementation daily.

## 2024-07-30 NOTE — ASSESSMENT & PLAN NOTE
- Stable.  - Patient was following with Dr. Geovanna Soliz with Hematology in [place], [place].  - Patient states she stopped taking iron supplementation because she was told she didn't need to take it anymore.

## 2024-07-30 NOTE — ASSESSMENT & PLAN NOTE
"- Patient tried Pepcid in the past, but made her, "feel weird."  - Starting Protonix 20mg daily.  " SHIFT CHANGE NOTE FOR [facility]    Bedside and Verbal shift change report given to Yoli Quintana RN (oncoming nurse) by Irina Mchugh RN  (offgoing nurse). Report included the following information SBAR, Kardex, MAR and Recent Results. Situation:   Code Status: Full Code   Reason for Admission: CVA  Hospital Day: 7   Problem List:   Hospital Problems  Date Reviewed: 5/29/2020          Codes Class Noted POA    Type 2 diabetes mellitus with stage 3 chronic kidney disease, without long-term current use of insulin (HCC) (Chronic) ICD-10-CM: E11.22, N18.3  ICD-9-CM: 250.40, 585.3  Unknown Yes    Overview Signed 5/23/2020  3:05 PM by Hiram Cruz MD     HbA1c (4/27/2020) = 6.7             On statin therapy due to risk of future cardiovascular event (Chronic) ICD-10-CM: J65.275  ICD-9-CM: V58.69  Unknown Yes    Overview Signed 5/23/2020  3:23 PM by Hiram Cruz MD     On Atorvastatin             COVID-19 virus not detected ICD-10-CM: D79.384  ICD-9-CM: V71.83  5/23/2020 Yes    Overview Addendum 5/24/2020 12:03 PM by Hiram Cruz MD     SARS-CoV-2 ([facility]) (collected 5/22/2020, resulted 5/23/2020): Not detected; SARS-CoV-2 ([facility]) (5/22/2020):  Not detected             * (Principal) Acute ischemic stroke [facility]) ICD-10-CM: I63.9  ICD-9-CM: 434.91  5/20/2020 Yes    Overview Signed 5/23/2020  2:57 PM by Hiram Cruz MD     Acute Ischemic Stroke (acute/subacute infarct involving the right callosal splenium and small focus within the right midbrain) with residual left hemiparesis and gait abnormality             Impaired mobility and ADLs ICD-10-CM: Z74.09, Z78.9  ICD-9-CM: V49.89  5/20/2020 Yes        Left hemiparesis ([address] ICD-10-CM: G81.94  ICD-9-CM: 342.90  5/20/2020 Yes        Gait abnormality ICD-10-CM: R26.9  ICD-9-CM: 781.2  5/20/2020 Yes        Pure hypercholesterolemia (Chronic) ICD-10-CM: E78.00  ICD-9-CM: 272.0  4/28/2020 Yes    Overview Signed 5/23/2020  3:21 PM by Hiram Cruz MD Lipid profile (4/28/2020) showed TG 96, , HDL 50, LDL 95             Current use of aspirin ICD-10-CM: Z79.82  ICD-9-CM: V58.66  4/28/2020 Yes        On clopidogrel therapy ICD-10-CM: Z79.01  ICD-9-CM: V58.61  4/28/2020 Yes        Cerebellar stroke (HCC) ICD-10-CM: I63.9  ICD-9-CM: 434.91  4/26/2020 Yes    Overview Signed 5/23/2020  2:54 PM by Gabby Laguerre MD     Acute Ischemic Stroke (multiple small acute infarcts within the left cerebellar hemisphere as well as left middle cerebellar peduncle) with residual right hemiparesis and cognitive communication deficit             Hemiparesis affecting right side as late effect of cerebrovascular accident (CVA) ([facility] [address] ICD-10-CM: G78.015  ICD-9-CM: 438.20  4/26/2020 Yes        Aphasia as late effect of cerebrovascular accident (CVA) ICD-10-CM: R12.277  ICD-9-CM: 438.11  4/26/2020 Yes        Hypertensive kidney disease with stage 3 chronic kidney disease (HCC) (Chronic) ICD-10-CM: I12.9, N18.3  ICD-9-CM: 403.90, 585.3  Unknown Yes    Overview Signed 5/24/2020 12:31 AM by Gabby Laguerre MD     2D echocardiogram (4/27/2020) showed EF 55-60%; no regional wall motion abnormality; there was no shunting at baseline or Valsalva on agitated saline contrast study                   Background:   Past Medical History:   Past Medical History:   Diagnosis Date    Acute ischemic stroke ([facility] [address] 5/20/2020    Acute Ischemic Stroke (acute/subacute infarct involving the right callosal splenium and small focus within the right midbrain) with residual left hemiparesis and gait abnormality    Allergic conjunctivitis     Allergic rhinitis     Aphasia as late effect of cerebrovascular accident (CVA) 4/26/2020    Cerebellar stroke ([facility] [address] 4/26/2020    Acute Ischemic Stroke (multiple small acute infarcts within the left cerebellar hemisphere as well as left middle cerebellar peduncle) with residual right hemiparesis and cognitive communication deficit    Chronic venous stasis dermatitis of both lower extremities     CKD (chronic kidney disease) stage 3, GFR 30-59 ml/min ([address] 1/20/2010    COVID-19 virus not detected 05/23/2020    SARS-CoV-2 ([facility]) (collected 5/22/2020, resulted 5/23/2020): Not detected; SARS-CoV-2 ([facility]) (5/22/2020):  Not detected    Current use of aspirin 4/28/2020    Erectile dysfunction associated with type 2 diabetes mellitus ([address]     Gait abnormality 5/20/2020    Gastroesophageal reflux disease     Glaucoma     On Bimatoprost    Hemiparesis affecting right side as late effect of cerebrovascular accident (CVA) ([address] 4/26/2020    History of malignant neoplasm of prostate     treated with ADT 2/4/19, switched to Eligard 45 on 3/18/19, initiated on Prolia on 9/12/19    History of obstructive sleep apnea 1/20/2010    Hypertensive kidney disease with stage 3 chronic kidney disease ([address]     2D echocardiogram (4/27/2020) showed EF 55-60%; no regional wall motion abnormality; there was no shunting at baseline or Valsalva on agitated saline contrast study    Increased urinary frequency     Left hemiparesis ([address] 5/20/2020    MGUS (monoclonal gammopathy of unknown significance)     Nocturia     Obesity, Class I, BMI 30-34.9     On clopidogrel therapy 4/28/2020    On statin therapy due to risk of future cardiovascular event     On Atorvastatin    Personal history of colonic polyps 09/24/2014    Pure hypercholesterolemia 4/28/2020    Lipid profile (4/28/2020) showed TG 96, , HDL 50, LDL 95    Stasis edema of both lower extremities     Type 2 diabetes mellitus with stage 3 chronic kidney disease, without long-term current use of insulin ([facility])     HbA1c (4/27/2020) = 6.7    Vitamin D insufficiency 12/9/2019    Vitamin D 25-Hydroxy (12/9/2019) = 23.3      Patient taking anticoagulants Luz   Patient has a defibrillator: no     Assessment:   Changes in Assessment throughout shift: NONE     Patient has central line: no Reasons if yes: N/A  Insertion date Last dressing date:N/A   Patient has Wagner Cath: no Reasons if yes:N/A  Insertion dateN/A     Last Vitals:     Vitals:    05/28/20 0753 05/28/20 2124 05/29/20 0751 05/29/20 1625   BP: 130/84 129/70 131/85 145/82   Pulse: 76 60 74 68   Resp: 19 18 17 18   Temp: 97.8 °F (36.6 °C) 97.9 °F (36.6 °C) 98.1 °F (36.7 °C) 97.4 °F (36.3 °C)   SpO2: 99% 98% 98% 99%   Weight:       Height:            PAIN    Pain Assessment    Pain Intensity 1: 0 (05/30/20 0443)      Pain Location 1:      Pain Intervention(s) 1: Medication (see MAR)  Patient Stated Pain Goal: 0 Patient Stated Pain Goal: 0  o Intervention effective: yes    o Other actions taken for pain: NO PAIN     Skin Assessment  Skin color    Condition/Temperature    Integrity    Turgor    Weekly Pressure Ulcer Documentation  Pressure  Injury Documentation: No Pressure Injury Noted-Pressure Ulcer Prevention Initiated  Wound Prevention & Protection Methods  Orientation of wound Orientation of Wound Prevention: Posterior  Location of Prevention Location of Wound Prevention: Buttocks, Sacrum/Coccyx  Dressing Present Dressing Present : No  Dressing Status    Wound Offloading Wound Offloading (Prevention Methods): Bed, pressure redistribution/air, Chair cushion, Pillows, Repositioning     INTAKE/OUPUT  Date 05/29/20 0700 - 05/30/20 0659 05/30/20 0700 - 05/31/20 0659   Shift 3694-0984 7392-7251 24 Hour Total 6440-3423 9006-4212 24 Hour Total   INTAKE   Shift Total(mL/kg)         OUTPUT   Urine(mL/kg/hr)           Urine Occurrence(s) 3 x 2 x 5 x      Stool           Stool Occurrence(s) 0 x 0 x 0 x      Shift Total(mL/kg)         NET         Weight (kg) 92 92 92 92 92 92       Recommendations:  Patient needs and requests: Standby assist with toileting. 1. Diet: DIABETIC    2. Pending tests/procedures: None at this time.       3. Functional Level/Equipment: W/C     Estimated Discharge Date: TBD Posted on Whiteboard in Patients Room:    [facility] Safety Check    A safety check occurred in the patient's room between off going nurse and oncoming nurse listed above. The safety check included the below items  Area Items   H  High Alert Medications - Verify all high alert medication drips (heparin, PCA, etc.)   E  Equipment - Suction is set up for ALL patients (with chary)  - Red plugs utilized for all equipment (IV pumps, etc.)  - WOWs wiped down at end of shift.  - Room stocked with oxygen, suction, and other unit-specific supplies   A  Alarms - Bed alarm is set for fall risk patients  - Ensure chair alarm is in place and activated if patient is up in a chair   L  Lines - Check IV for any infiltration  - Wagner bag is empty if patient has a Wagner   - Tubing and IV bags are labeled   S  Safety   - Room is clean, patient is clean, and equipment is clean. - Hallways are clear from equipment besides carts. - Fall bracelet on for fall risk patients  - Ensure room is clear and free of clutter  - Suction is set up for ALL patients (with chary)  - Hallways are clear from equipment besides carts.    - Isolation precautions followed, supplies available outside room, sign posted

## 2024-07-30 NOTE — PROGRESS NOTES
Subjective:      Patient ID: Milla Rider is a 79 y.o.  female.    Chief Complaint: Chronic Medical Management    Mucinous Carcinoma of Right Breast: Patient following with [facility] in [place], [place]. Patient following with Dr. Long Mathis with Oncology. She was diagnosed in October 2020. Patient had right breast mastectomy and 43 lymph nodes removed surrounding right breast on 11/16/20. Patient is taking anastrazole for a total of 5 years.     KAREN: Patient was following with Dr. Geovanna Soliz with Hematology in [place], [place]. Patient states she stopped taking iron supplementation because she was told she didn't need to take it anymore.    HTN: /70. Patient states compliance with Lotrel.     Insomnia/Anxiety/Pruritis: Patient stopped taking Vistaril in the past due to side effects. She still has insomnia, anxiety, and itching.    Osteoporosis: Patient following with Dr. Tiffanie Piper with Endocrinology in [place], [place]. Patient taking vitamin D.     Allergic Rhinitis: ENT follow-up pending.    Palpitations: Patient evaluated by Dr. Johnathan Prince with Cardiology in March/April of this year. Evaluation negative.    Preventative Health: Medicare Wellness completed on 01/29/24.    Review of Systems   Constitutional:  Negative for activity change, appetite change, chills, diaphoresis, fatigue, fever and unexpected weight change.   Eyes:  Negative for visual disturbance.   Respiratory:  Positive for shortness of breath. Negative for cough, wheezing and stridor.    Cardiovascular:  Positive for palpitations. Negative for chest pain and leg swelling.   Gastrointestinal:  Negative for abdominal pain, blood in stool, constipation, diarrhea, nausea and vomiting.   Genitourinary:  Negative for dysuria and hematuria.   Musculoskeletal:  Positive for arthralgias and myalgias.   Skin:  Negative for rash and wound.   Allergic/Immunologic: Positive for environmental allergies.   Neurological:  Negative for  "dizziness, syncope, weakness, numbness and headaches.   Psychiatric/Behavioral:  Positive for sleep disturbance. Negative for confusion, dysphoric mood, hallucinations, self-injury and suicidal ideas. The patient is nervous/anxious.      Objective:   /70 (BP Location: Left arm)   Pulse 69   Temp 97.4 °F (36.3 °C) (Temporal)   Resp 16   Ht 5' 5" (1.651 m)   Wt 78.4 kg (172 lb 12.8 oz)   SpO2 99%   BMI 28.76 kg/m²     Physical Exam  Vitals and nursing note reviewed.   Constitutional:       General: She is not in acute distress.     Appearance: Normal appearance. She is not ill-appearing or toxic-appearing.   HENT:      Head: Normocephalic and atraumatic.   Eyes:      Conjunctiva/sclera: Conjunctivae normal.   Cardiovascular:      Rate and Rhythm: Normal rate and regular rhythm.      Heart sounds: Normal heart sounds. No murmur heard.  Pulmonary:      Effort: Pulmonary effort is normal. No respiratory distress.      Breath sounds: Normal breath sounds.   Abdominal:      General: There is no distension.      Palpations: Abdomen is soft.      Tenderness: There is no abdominal tenderness.   Musculoskeletal:         General: No deformity.      Right lower leg: No edema.      Left lower leg: No edema.   Skin:     General: Skin is warm and dry.      Findings: No lesion or rash.   Neurological:      General: No focal deficit present.      Mental Status: She is alert. Mental status is at baseline.      Motor: No weakness.      Coordination: Coordination normal.   Psychiatric:         Mood and Affect: Mood normal.         Behavior: Behavior normal.         Thought Content: Thought content normal.         Judgment: Judgment normal.       Assessment/Plan:   1. Mucinous carcinoma of right breast  Assessment & Plan:  - Patient following with [facility] in [place], [place]. Patient following with Dr. Long Mathis with Oncology. She sees Oncology every 6 months.  - She was diagnosed in October 2020.  - Patient had right breast " mastectomy and 43 lymph nodes removed surrounding right breast on 11/16/20.  - Patient is taking anastrazole for a total of 5 years. Anastrazole per Oncology.      2. Iron deficiency anemia, unspecified iron deficiency anemia type  Assessment & Plan:  - Stable.  - Patient was following with Dr. Geovanna Soliz with Hematology in [place], [place].  - Patient states she stopped taking iron supplementation because she was told she didn't need to take it anymore.      3. Primary hypertension  Assessment & Plan:  - BP well-controlled.  - Continue Amlodipine-Benazepril 10mg-20mg daily.      4. Insomnia, unspecified type  Assessment & Plan:  - Starting Remeron 7.5mg nightly.  - Patient stopped taking Vistaril in the past due to side effects.    Orders:  -     mirtazapine (REMERON) 7.5 MG Tab; Take 1 tablet (7.5 mg total) by mouth every evening.  Dispense: 90 tablet; Refill: 0    5. Anxiety  Assessment & Plan:  - Starting Remeron 7.5mg nightly.  - Patient stopped taking Vistaril in the past due to side effects.    Orders:  -     mirtazapine (REMERON) 7.5 MG Tab; Take 1 tablet (7.5 mg total) by mouth every evening.  Dispense: 90 tablet; Refill: 0    6. Pruritus  Assessment & Plan:  - Starting Remeron 7.5mg nightly.  - Patient stopped taking Vistaril in the past due to side effects.    Orders:  -     mirtazapine (REMERON) 7.5 MG Tab; Take 1 tablet (7.5 mg total) by mouth every evening.  Dispense: 90 tablet; Refill: 0    7. Osteoporosis, unspecified osteoporosis type, unspecified pathological fracture presence  Assessment & Plan:  - Patient following with Dr. Tiffanie Piper with Endocrinology in [place], [place].  - Patient taking calcium + vitamin D supplementation daily.      8. Non-seasonal allergic rhinitis, unspecified trigger  Assessment & Plan:  - Stable.  - Continue Flonase and Singulair.    Orders:  -     Ambulatory referral/consult to ENT; Future; Expected date: 08/06/2024    9. Gastroesophageal reflux disease, unspecified whether  "esophagitis present  Assessment & Plan:  - Patient tried Pepcid in the past, but made her, "feel weird."  - Starting Protonix 20mg daily.    Orders:  -     pantoprazole (PROTONIX) 20 MG tablet; Take 1 tablet (20 mg total) by mouth once daily.  Dispense: 90 tablet; Refill: 0       Follow up in about 6 months (around 1/30/2025) for Medicare Wellness.                "

## 2024-07-30 NOTE — ASSESSMENT & PLAN NOTE
- Starting Remeron 7.5mg nightly.  - Patient stopped taking Vistaril in the past due to side effects.

## 2024-07-30 NOTE — ASSESSMENT & PLAN NOTE
- Patient following with [facility] in [place], [place]. Patient following with Dr. Long Mathis with Oncology. She sees Oncology every 6 months.  - She was diagnosed in October 2020.  - Patient had right breast mastectomy and 43 lymph nodes removed surrounding right breast on 11/16/20.  - Patient is taking anastrazole for a total of 5 years. Anastrazole per Oncology.

## 2024-08-08 ENCOUNTER — TELEPHONE (OUTPATIENT)
Dept: FAMILY MEDICINE | Facility: CLINIC | Age: 79
End: 2024-08-08
Payer: MEDICARE

## 2024-10-25 DIAGNOSIS — G47.00 INSOMNIA, UNSPECIFIED TYPE: ICD-10-CM

## 2024-10-25 DIAGNOSIS — L29.9 PRURITUS: ICD-10-CM

## 2024-10-25 DIAGNOSIS — K21.9 GASTROESOPHAGEAL REFLUX DISEASE, UNSPECIFIED WHETHER ESOPHAGITIS PRESENT: ICD-10-CM

## 2024-10-25 DIAGNOSIS — F41.9 ANXIETY: ICD-10-CM

## 2024-10-25 RX ORDER — MIRTAZAPINE 7.5 MG/1
7.5 TABLET, FILM COATED ORAL NIGHTLY
Qty: 90 TABLET | Refills: 0 | Status: SHIPPED | OUTPATIENT
Start: 2024-10-25

## 2024-10-25 RX ORDER — PANTOPRAZOLE SODIUM 20 MG/1
20 TABLET, DELAYED RELEASE ORAL
Qty: 90 TABLET | Refills: 0 | Status: SHIPPED | OUTPATIENT
Start: 2024-10-25

## 2024-11-05 ENCOUNTER — TELEPHONE (OUTPATIENT)
Dept: FAMILY MEDICINE | Facility: CLINIC | Age: 79
End: 2024-11-05

## 2024-11-05 ENCOUNTER — OFFICE VISIT (OUTPATIENT)
Dept: FAMILY MEDICINE | Facility: CLINIC | Age: 79
End: 2024-11-05
Payer: MEDICARE

## 2024-11-05 VITALS
BODY MASS INDEX: 28.35 KG/M2 | WEIGHT: 170.13 LBS | DIASTOLIC BLOOD PRESSURE: 71 MMHG | RESPIRATION RATE: 18 BRPM | HEART RATE: 68 BPM | TEMPERATURE: 98 F | SYSTOLIC BLOOD PRESSURE: 135 MMHG | HEIGHT: 65 IN | OXYGEN SATURATION: 98 %

## 2024-11-05 DIAGNOSIS — L29.9 PRURITUS: ICD-10-CM

## 2024-11-05 DIAGNOSIS — F51.01 PRIMARY INSOMNIA: ICD-10-CM

## 2024-11-05 DIAGNOSIS — Z23 NEED FOR INFLUENZA VACCINATION: ICD-10-CM

## 2024-11-05 DIAGNOSIS — I10 PRIMARY HYPERTENSION: Primary | ICD-10-CM

## 2024-11-05 PROCEDURE — 1101F PT FALLS ASSESS-DOCD LE1/YR: CPT | Mod: CPTII,,, | Performed by: NURSE PRACTITIONER

## 2024-11-05 PROCEDURE — G0008 ADMIN INFLUENZA VIRUS VAC: HCPCS | Mod: ,,, | Performed by: NURSE PRACTITIONER

## 2024-11-05 PROCEDURE — 1126F AMNT PAIN NOTED NONE PRSNT: CPT | Mod: CPTII,,, | Performed by: NURSE PRACTITIONER

## 2024-11-05 PROCEDURE — 1160F RVW MEDS BY RX/DR IN RCRD: CPT | Mod: CPTII,,, | Performed by: NURSE PRACTITIONER

## 2024-11-05 PROCEDURE — 3288F FALL RISK ASSESSMENT DOCD: CPT | Mod: CPTII,,, | Performed by: NURSE PRACTITIONER

## 2024-11-05 PROCEDURE — 99214 OFFICE O/P EST MOD 30 MIN: CPT | Mod: 25,,, | Performed by: NURSE PRACTITIONER

## 2024-11-05 PROCEDURE — 3075F SYST BP GE 130 - 139MM HG: CPT | Mod: CPTII,,, | Performed by: NURSE PRACTITIONER

## 2024-11-05 PROCEDURE — 1159F MED LIST DOCD IN RCRD: CPT | Mod: CPTII,,, | Performed by: NURSE PRACTITIONER

## 2024-11-05 PROCEDURE — 90653 IIV ADJUVANT VACCINE IM: CPT | Mod: ,,, | Performed by: NURSE PRACTITIONER

## 2024-11-05 PROCEDURE — 3078F DIAST BP <80 MM HG: CPT | Mod: CPTII,,, | Performed by: NURSE PRACTITIONER

## 2024-11-05 RX ORDER — AMLODIPINE AND BENAZEPRIL HYDROCHLORIDE 5; 10 MG/1; MG/1
1 CAPSULE ORAL DAILY
Qty: 90 CAPSULE | Refills: 3 | Status: SHIPPED | OUTPATIENT
Start: 2024-11-05 | End: 2025-11-05

## 2024-11-05 RX ORDER — TRIAMCINOLONE ACETONIDE 1 MG/G
OINTMENT TOPICAL 2 TIMES DAILY
Qty: 453.6 G | Refills: 1 | Status: SHIPPED | OUTPATIENT
Start: 2024-11-05

## 2024-11-05 RX ORDER — TRAZODONE HYDROCHLORIDE 50 MG/1
TABLET ORAL
Qty: 30 TABLET | Refills: 2 | Status: SHIPPED | OUTPATIENT
Start: 2024-11-05

## 2024-11-05 NOTE — ASSESSMENT & PLAN NOTE
Rx Triamcinolone   Start OTC Benadryl  Pt was referred to Allergy specialist; states she will call and schedule an appt  Keep appt with PCP for follow up

## 2024-11-05 NOTE — TELEPHONE ENCOUNTER
----- Message from Asael sent at 11/5/2024 11:44 AM CST -----  Who Called: Milla Rider    Caller is requesting assistance/information from provider's office.    Symptoms (please be specific):    How long has patient had these symptoms:    List of preferred pharmacies on file (remove unneeded): [unfilled]  If different, enter pharmacy into here including location and phone number:         Patient's Preferred Phone Number on File: [phone]   Best Call Back Number, if different:  Additional Information: pt would like to speak to nurse about , amlodipine-benazepril 5-10 mg (LOTREL) 5-10 mg per capsule, stated medication is pending not sure why , asked for a follow up

## 2024-11-05 NOTE — ASSESSMENT & PLAN NOTE
Hypotension at home  Currently meds: Lotrel 10-20 mg po daily.   Instructed to decrease Lotrel to 5-10 mg; Rx sent  F/U if no improvement  Instructed to continue to monitor symptoms  Report to ED for any weakness, dizziness, syncope, CP, SOB, and/or worsening symptoms  Pt is agreeable to plan and verbalizes understanding

## 2024-11-05 NOTE — PROGRESS NOTES
"Subjective:      Patient ID: Milla Rider is a 79 y.o. Black or  female      Chief Complaint: Follow-up (Low BP)       Past Medical History:   Diagnosis Date    Anemia     Anxiety 6/9/2022    Arthritis     Cancer     Gastroesophageal reflux disease 6/13/2023    Hypertension     Insomnia     Iron deficiency anemia 11/6/2020    Osteoporosis         HPI  Present to clinic for follow up HTN.    HTN:  Pt has complaints of hypotension at home; states BP low at home (101/50's).  Hypotension is associated with weakness and dizziness.  She stopped BP medication x 4 days; SBP increased to 140's and she resumed BP meds last night.  BP is 135/71 today.  Currently meds: Lotrel 10-20 mg po daily.  Denies any headaches, weakness, dizziness, CP, or SOB. Denies any other problems.      C/O of Insomnia x 4 years (since starting Arimidex).  Associated with pruritus.  Unable to take Hydroxyzine and Remeron; states it causes palpitations and lightheadedness.  She is requesting a different sleep medication.  Previously referred to "allergy specialist."        Patient Care Team:  Jennifer Oliveira DO as PCP - General (Family Medicine)      Review of Systems   Constitutional: Negative.  Negative for appetite change, chills and fever.        Insomnia   HENT: Negative.     Eyes: Negative.  Negative for discharge and redness.   Respiratory: Negative.  Negative for shortness of breath.    Cardiovascular: Negative.  Negative for chest pain.   Gastrointestinal: Negative.    Endocrine: Negative.    Genitourinary: Negative.    Integumentary:         Itching Negative.   Allergic/Immunologic: Negative.    Neurological: Negative.    Psychiatric/Behavioral: Negative.     All other systems reviewed and are negative.          Objective:      Vitals:    11/05/24 0916   BP: 135/71   Pulse:    Resp:    Temp:       Body mass index is 28.31 kg/m².     Physical Exam  Vitals reviewed.   Constitutional:       Appearance: Normal appearance. "   HENT:      Head: Normocephalic.      Mouth/Throat:      Mouth: Mucous membranes are moist.   Eyes:      Conjunctiva/sclera: Conjunctivae normal.      Pupils: Pupils are equal, round, and reactive to light.   Cardiovascular:      Rate and Rhythm: Normal rate and regular rhythm.   Pulmonary:      Effort: Pulmonary effort is normal. No respiratory distress.      Breath sounds: Normal breath sounds.   Musculoskeletal:         General: Normal range of motion.      Cervical back: Normal range of motion and neck supple.   Skin:     General: Skin is warm and dry.      Comments: Rash noted bilateral extremities   Neurological:      Mental Status: She is alert and oriented to person, place, and time.   Psychiatric:         Mood and Affect: Mood normal.            Current Outpatient Medications:     anastrozole (ARIMIDEX) 1 mg Tab, Take 1 mg by mouth once daily., Disp: , Rfl:     cholecalciferol, vitamin D3, 1,250 mcg (50,000 unit) Tab, Take 50,000 Units by mouth., Disp: , Rfl:     fluticasone propionate (FLONASE) 50 mcg/actuation nasal spray, 2 SPRAYS (100 MCG TOTAL) BY EACH NOSTRIL ROUTE DAILY., Disp: 48 mL, Rfl: 3    meloxicam (MOBIC) 7.5 MG tablet, TAKE 1 TABLET (7.5 MG TOTAL) BY MOUTH DAILY AS NEEDED FOR PAIN. TAKE WITH FOOD. MED FOR ARTHRITIS., Disp: , Rfl:     montelukast (SINGULAIR) 5 MG chewable tablet, TAKE 1 TABLET BY MOUTH EVERY DAY IN THE EVENING, Disp: 90 tablet, Rfl: 3    amlodipine-benazepril 5-10 mg (LOTREL) 5-10 mg per capsule, Take 1 capsule by mouth once daily., Disp: 90 capsule, Rfl: 3    traZODone (DESYREL) 50 MG tablet, Take 1 tablet po at bedtime prn sleep, Disp: 30 tablet, Rfl: 2    triamcinolone acetonide 0.1% (KENALOG) 0.1 % ointment, Apply topically 2 (two) times daily., Disp: 453.6 g, Rfl: 1  No current facility-administered medications for this visit.    Assessment & Plan:     Problem List Items Addressed This Visit          Derm    Pruritus     Rx Triamcinolone   Start OTC Benadryl  Pt was  referred to Allergy specialist; states she will call and schedule an appt  Keep appt with PCP for follow up         Relevant Medications    triamcinolone acetonide 0.1% (KENALOG) 0.1 % ointment       Cardiac/Vascular    Hypertension - Primary     Hypotension at home  Currently meds: Lotrel 10-20 mg po daily.   Instructed to decrease Lotrel to 5-10 mg; Rx sent  F/U if no improvement  Instructed to continue to monitor symptoms  Report to ED for any weakness, dizziness, syncope, CP, SOB, and/or worsening symptoms  Pt is agreeable to plan and verbalizes understanding           Relevant Medications    amlodipine-benazepril 5-10 mg (LOTREL) 5-10 mg per capsule       Other    Insomnia     Unable to take Hydroxyzine and Remeron s/t palpitations and lightheadedness   Start trial of Trazodone at bedtime prn sleep  F/U if no improvement  Keep appt with PCP for follow up         Relevant Medications    traZODone (DESYREL) 50 MG tablet     Other Visit Diagnoses       Need for influenza vaccination        Relevant Medications    influenza (adjuvanted) (Fluad) 45 mcg/0.5 mL IM vaccine (> or = 64 yo) 0.5 mL (Completed)

## 2024-11-05 NOTE — ASSESSMENT & PLAN NOTE
Unable to take Hydroxyzine and Remeron s/t palpitations and lightheadedness   Start trial of Trazodone at bedtime prn sleep  F/U if no improvement  Keep appt with PCP for follow up

## 2025-01-27 DIAGNOSIS — K21.9 GASTROESOPHAGEAL REFLUX DISEASE, UNSPECIFIED WHETHER ESOPHAGITIS PRESENT: ICD-10-CM

## 2025-01-27 DIAGNOSIS — L29.9 PRURITUS: ICD-10-CM

## 2025-01-27 RX ORDER — TRIAMCINOLONE ACETONIDE 1 MG/G
1 OINTMENT TOPICAL 2 TIMES DAILY
Qty: 454 G | Refills: 1 | Status: SHIPPED | OUTPATIENT
Start: 2025-01-27

## 2025-01-27 RX ORDER — PANTOPRAZOLE SODIUM 20 MG/1
20 TABLET, DELAYED RELEASE ORAL
Qty: 90 TABLET | Refills: 0 | OUTPATIENT
Start: 2025-01-27

## 2025-01-28 NOTE — TELEPHONE ENCOUNTER
----- Message from Fatemeh Cifuentes sent at 1/28/2025  9:25 AM CST -----  .Type:  Sooner Apoointment Request    Caller is requesting a sooner appointment.  Caller declined first available appointment listed below.  Caller will not accept being placed on the waitlist and is requesting a message be sent to doctor.  Name of Caller: Milla  When is the first available appointment? March 27  Symptoms:N/A  Would the patient rather a call back or a response via [facility]?   Best Call Back Number:[phone]   Additional Information: Pt [facility] appt was rescheduled, so she had to rescheduled Pcp appt.  Pt stated March is too far out and needs a sooner appt for 6 month checkup

## 2025-01-30 DIAGNOSIS — F51.01 PRIMARY INSOMNIA: Primary | ICD-10-CM

## 2025-01-30 RX ORDER — TRAZODONE HYDROCHLORIDE 50 MG/1
TABLET ORAL
Qty: 90 TABLET | Refills: 3 | Status: SHIPPED | OUTPATIENT
Start: 2025-01-30

## 2025-02-10 ENCOUNTER — OFFICE VISIT (OUTPATIENT)
Dept: FAMILY MEDICINE | Facility: CLINIC | Age: 80
End: 2025-02-10
Payer: MEDICARE

## 2025-02-10 VITALS
BODY MASS INDEX: 28.32 KG/M2 | SYSTOLIC BLOOD PRESSURE: 152 MMHG | WEIGHT: 170 LBS | DIASTOLIC BLOOD PRESSURE: 80 MMHG | TEMPERATURE: 97 F | OXYGEN SATURATION: 99 % | HEART RATE: 78 BPM | HEIGHT: 65 IN | RESPIRATION RATE: 19 BRPM

## 2025-02-10 DIAGNOSIS — I10 PRIMARY HYPERTENSION: ICD-10-CM

## 2025-02-10 DIAGNOSIS — H61.23 BILATERAL IMPACTED CERUMEN: ICD-10-CM

## 2025-02-10 DIAGNOSIS — J32.9 SINUSITIS, UNSPECIFIED CHRONICITY, UNSPECIFIED LOCATION: Primary | ICD-10-CM

## 2025-02-10 PROBLEM — Z00.00 WELLNESS EXAMINATION: Status: ACTIVE | Noted: 2025-02-10

## 2025-02-10 PROCEDURE — 3077F SYST BP >= 140 MM HG: CPT | Mod: CPTII,,, | Performed by: NURSE PRACTITIONER

## 2025-02-10 PROCEDURE — 3079F DIAST BP 80-89 MM HG: CPT | Mod: CPTII,,, | Performed by: NURSE PRACTITIONER

## 2025-02-10 PROCEDURE — 1160F RVW MEDS BY RX/DR IN RCRD: CPT | Mod: CPTII,,, | Performed by: NURSE PRACTITIONER

## 2025-02-10 PROCEDURE — 99214 OFFICE O/P EST MOD 30 MIN: CPT | Mod: ,,, | Performed by: NURSE PRACTITIONER

## 2025-02-10 PROCEDURE — 1159F MED LIST DOCD IN RCRD: CPT | Mod: CPTII,,, | Performed by: NURSE PRACTITIONER

## 2025-02-10 PROCEDURE — 1101F PT FALLS ASSESS-DOCD LE1/YR: CPT | Mod: CPTII,,, | Performed by: NURSE PRACTITIONER

## 2025-02-10 PROCEDURE — 3288F FALL RISK ASSESSMENT DOCD: CPT | Mod: CPTII,,, | Performed by: NURSE PRACTITIONER

## 2025-02-10 PROCEDURE — 1126F AMNT PAIN NOTED NONE PRSNT: CPT | Mod: CPTII,,, | Performed by: NURSE PRACTITIONER

## 2025-02-10 RX ORDER — DOXYCYCLINE 100 MG/1
100 CAPSULE ORAL EVERY 12 HOURS
Qty: 20 CAPSULE | Refills: 0 | Status: SHIPPED | OUTPATIENT
Start: 2025-02-10 | End: 2025-02-12

## 2025-02-10 NOTE — ASSESSMENT & PLAN NOTE
BP elevated; Asymptomatic  Did not take BP medications today  Continue Lotrel as prescribed  Instructed to take BP meds prior to all clinic appointments  Instructed to report to ED for any BP >200/100, SOB, CP, and/or worsening symptoms  Daily BP check; bring log to all appointments  RTC in 1-2 weeks for follow up  Verbalizes all understanding

## 2025-02-10 NOTE — ASSESSMENT & PLAN NOTE
HEMATOLOGY-ONCOLOGY PROGRESS NOTE    Subjective  CC: Followup for anemia, DVT  Patient reports nausea but no emesis. No pain or SOB.     Allergies  ALLERGIES:  Contrast media and Penicillins    Medications  Medications Prior to Admission   Medication Sig Dispense Refill   • ALPRAZolam (XANAX) 0.25 MG tablet Take 0.25 mg by mouth 3 times daily as needed for Sleep or Anxiety.     • metoPROLOL succinate (TOPROL-XL) 50 MG 24 hr tablet Take 50 mg by mouth 2 times daily.     • amLODIPine (NORVASC) 5 MG tablet Take 5 mg by mouth daily.     • rivaroxaban (XARELTO) 20 MG Tab Take 20 mg by mouth daily (with breakfast).     • ciprofloxacin (CIPRO) 250 MG tablet Take 250 mg by mouth daily.     • MAGNESIUM OXIDE PO Take 400 mg by mouth 2 times daily.     • Folic Acid-Vit B6-Vit B12 (FOLBEE) 2.5-25-1 MG Tab Take 2.5-25 mg by mouth daily.     • escitalopram (LEXAPRO) 10 MG tablet Take 10 mg by mouth daily.     • prednisoLONE acetate (PRED FORTE, OMNIPRED) 1 % ophthalmic suspension 1 drop 4 times daily. Indications: cataract sx     • ofloxacin (OCUFLOX) 0.3 % ophthalmic solution Place 3 drops into both eyes 4 times daily.      • labetalol (NORMODYNE) 100 MG tablet Take 1 tablet by mouth 2 times daily.  2   • omeprazole (PRILOSEC) 40 MG capsule Take 40 mg by mouth daily.   2   • hydrALAZINE (APRESOLINE) 100 MG tablet Take 0.5 tablets by mouth 2 times daily. Stop Amlodipine (Patient taking differently: Take 100 mg by mouth 3 times daily. Stop Amlodipine) 180 tablet 3   • bumetanide (BUMEX) 2 MG tablet Take 1 tablet by mouth daily. 90 tablet 3   • prochlorperazine (COMPAZINE) 10 MG tablet Take 10 mg by mouth every 6 hours as needed.   0     Current Facility-Administered Medications   Medication Dose Route Frequency Provider Last Rate Last Dose   • influenza virus quadrivalent vaccine inactivated (PRESERVATIVE FREE) injection 0.5 mL  0.5 mL Intramuscular PRN Ewa Cavazos MD       • doxycycline (VIBRAMYCIN) 100 mg in sodium  Nasal saline prn  Rx Doxycyline (Allergy to PCN)  ENT can be considered if no improvement  Instructed to continue to monitor symptoms  Report to ED for any CP, SOB, and/or worsening symptoms  Pt is agreeable to plan and verbalizes understanding       chloride 0.9 % 250 mL IVPB  100 mg Intravenous 2 times per day Nurys Brown MD   Stopped at 11/23/19 2210   • sodium chloride 0.9% infusion   Intravenous Continuous PRN Pola Esquivel MD       • ALPRAZolam (XANAX) tablet 0.25 mg  0.25 mg Oral Q8H PRN Mariana Yates, CNP       • amLODIPine (NORVASC) tablet 5 mg  5 mg Oral Daily Mariana Yates, CNP   5 mg at 11/23/19 1102   • [Held by provider] bumetanide (BUMEX) tablet 2 mg  2 mg Oral Daily Mariana Roche, CNP   2 mg at 11/23/19 1102   • ciprofloxacin (CIPRO) tablet 250 mg  250 mg Oral QAM AC Mariana Roche, CNP   250 mg at 11/23/19 0615   • escitalopram (LEXAPRO) tablet 10 mg  10 mg Oral Daily Mariana Roche, CNP   10 mg at 11/23/19 1103   • hydrALAZINE (APRESOLINE) tablet 100 mg  100 mg Oral TID Mariana Yates, CNP   100 mg at 11/23/19 2100   • labetalol (NORMODYNE) tablet 100 mg  100 mg Oral 2 times per day Mariana Roche, CNP   100 mg at 11/23/19 2100   • magnesium oxide (MAG-OX) tablet 400 mg  400 mg Oral BID WC Mariana Roche, CNP   400 mg at 11/23/19 1830   • metoPROLOL succinate (TOPROL-XL) ER tablet 50 mg  50 mg Oral 2 times per day Mariana Roche, CNP   50 mg at 11/23/19 1102   • ofloxacin (OCUFLOX) 0.3 % ophthalmic solution 3 drop  3 drop Both Eyes 4x Daily Mariana Roche, CNP   3 drop at 11/23/19 2104   • pantoprazole (PROTONIX) EC tablet 40 mg  40 mg Oral QAM AC Mariana Roche, CNP   40 mg at 11/23/19 0615   • prochlorperazine (COMPAZINE) tablet 5 mg  5 mg Oral Q6H PRN Mariana Roche, CNP   5 mg at 11/24/19 0630   • sodium chloride 0.9 % flush bag 25 mL  25 mL Intravenous PRN Mariana Yates CNP   Stopped at 11/24/19 0004   • sodium chloride (PF) 0.9 % injection 2 mL  2 mL Intracatheter 2 times per day Mariana Yates, DEVANTE   2 mL at 11/23/19 6164        Review of Systems  Constitutional: Negative for fever, chills, change in appetite or fatigue.  Skin: Negative for rash or wounds.  HEENT: Negative for eye drainage, rhinorrhea, ear pain, sore throat or neck  pain.  Respiratory: Negative cough, wheezing or shortness of breath.  Cardiovascular: Negative for chest pain, chest pressure, palpitations or diaphoresis.  Gastrointestinal: +nausea, -vomiting, diarrhea, abdominal pain, black or tarry stools.  Genitourinary: Negative for dysuria, urgency, frequency, hematuria or flank pain.  Extremities:  Negative for joint swelling or joint pain.  Neurologic:  Negative for change in sensory or motor function.  Negative for headache, change in gait, vertigo, vision or speech.  Endocrine: Negative for heat or cold intolerance, weight loss or gain.  Hematological: Negative for bleeding, bruising or lymphadenopathy.  Psychiatric: Negative for change in affect, anxiety, depression, mentation or sleep disturbance.       PHYSICAL EXAM:   Vitals:    11/23/19 2326   BP: 132/65   Pulse: 77   Resp: 16   Temp: 98.1 °F (36.7 °C)     Oncology Encounter Vitals   ONC OP Encounter Vitals Group      BP 11/22/19 1352 90/46      Pulse 11/22/19 1352 74      Resp 11/22/19 1352 16      Temp 11/22/19 1352 98.6 °F (37 °C)      Temp src 11/22/19 1352 Oral      SpO2 11/22/19 1352 96 %      Weight 11/22/19 1352 163 lb (73.9 kg)      Height 11/22/19 1915 5' 4\" (1.626 m)      Pain Score --       Pain Location --       Pain Education? --       BSA (Calculated - m2) - [employer] 11/22/19 1915 1.79      BMI (Calculated) 11/22/19 1915 27.74     ECOG Performance Status    3       CONSTITUTIONAL:  Alert, cooperative, oriented.  Mood and affect appropriate.  Well nourished.    HEAD:  Normocephalic  EYES:  Conjunctivae and sclerae are clear and without icterus.   ENMT:   No oral exudates, ulcers, masses, thrush or mucositis.  Oropharynx clear.  Tongue normal. Mucus membranes are moist  HEMATOLOGIC/LYMPHATIC:  No petechiae or purpura.  No tender or palpable lymph nodes in the cervical, supraclavicular, axillary or inguinal area.  RESPIRATORY:  Lungs are clear to auscultation without rhonchi or  wheezing.  CARDIOVASCULAR:  Regular rate and rhythm of heart without murmurs.  CHEST:  Chest is symmetric, without chest wall deformities.  ABDOMEN:  Soft non-tender, non-distended, no masses, ascites or hepatosplenomegaly.   BACK/SPINE:  Non-tender to palpation.  MUSCULOSKELETAL:  No tenderness or swelling, normal range of motion without obvious weakness.  EXTREMITIES:  No visible deformities, no cyanosis, or edema.   INTEGUMENTARY:  No rashes or lesions suggestive of malignancy.  NEUROLOGIC:  No sensory or motor deficits, Alert and oriented times three  PSYCHIATRIC:  Alert and oriented times three.  Coherent speech.  Verbalizes understanding of our discussions today.    Imaging  XR CHEST PA OR AP 1 VIEW  Narrative: EXAM: XR CHEST PA OR AP 1 VIEW    CLINICAL INDICATION: Recent chemotherapy, weakness.    COMPARISON: 09/18/2019.  Impression: FINDINGS AND IMPRESSION: Chemotherapy injection port is again noted in the right chest.  Tip of its catheter projects onto the lower SVC.  Cardiomediastinal silhouette remains moderately enlarged.  Airspace opacities behind the left heart and in the   right lung base have worsened since the prior study.  There is blunting of the costophrenic angles.  This may represent atelectasis due to enlarging but still small pleural effusions or less likely consolidation.  There is no evidence of pneumothorax.  Trachea is midline.  Chronic degenerative changes again noted in the thoracic spine.    Electronically Signed by: SRAVANI ALEXIS M.D.   Signed on: 11/22/2019 4:14 PM        Labs   Recent Results (from the past 24 hour(s))   RAPID INFLUENZA A/B BY PCR    Collection Time: 11/23/19 10:50 AM   Result Value Ref Range    Specimen Source Nasopharyngeal     Influenza A Virus NOT DETECTED NOT DETECTED    Influenza B Virus NOT DETECTED NOT DETECTED   Hemoglobin and Hematocrit    Collection Time: 11/23/19  9:45 PM   Result Value Ref Range    HGB 8.4 (L) 12.0 - 15.5 g/dL    HCT 26.3 (L) 36.0 -  46.5 %         Assessment and Plan  Active Hospital Problems    Diagnosis Date Noted   • Symptomatic anemia 10/23/2019     Priority: Low         1. Metastatic fallopian tube cancer, heavily pretreated, just started Abraxane Nov 20, 2019  2. Fatigue and lethargy following chemotherapy  3. Anemia of cancer, chemotherapy and chronic disease, on outpatient Procrit. Hg drop to 7.6, s/p pRBC with improved Hg.  4. Chronic kidney insufficiency; recent outpatient Cr ranging from 1.7-2.3; seen by nephrology yesterday with iron studies performed.  5. Hx of DVT Apr 2019, on chronic Xarelto; on hold     Recommendations:  1. Monitor CBC. Recheck in AM  2. Will add Zofran for nausea  3. Will d/w with primary hematologist. Given low CrCl, will either resume Xarelto with dose reduction or switch to warfarin tomorrow      Afshin Martin MD  11/24/2019      15 Minutes were spent with patient >50% of that time spent in counseling.

## 2025-02-10 NOTE — PROGRESS NOTES
Subjective:      Patient ID: Milla Rider is a 80 y.o. Black or  female      Chief Complaint: Sinusitis       Past Medical History:   Diagnosis Date    Anemia     Anxiety 6/9/2022    Arthritis     Cancer     Gastroesophageal reflux disease 6/13/2023    Hypertension     Insomnia     Iron deficiency anemia 11/6/2020    Osteoporosis         HPI  Sinus Problem  This is a new problem. Episode onset: 2 weeks. There has been no fever. Associated symptoms include headaches, sinus pressure and sneezing. Pertinent negatives include no chills, coughing or shortness of breath. Treatments tried: Flonase. The treatment provided no relief.     HTN  BP elevated today.  Pt did not take BP medications today.  Currently taking Lotrel 5-10 mg po daily. Denies any headaches, weakness, dizziness, CP, or SOB. Denies any other problems.        Patient Care Team:  Jennifer Oliveira DO as PCP - General (Family Medicine)      Review of Systems   Constitutional: Negative.  Negative for appetite change, chills and fever.   HENT:  Positive for sinus pressure/congestion and sneezing.    Eyes: Negative.  Negative for discharge and redness.   Respiratory: Negative.  Negative for cough and shortness of breath.    Cardiovascular: Negative.  Negative for chest pain.   Gastrointestinal: Negative.    Endocrine: Negative.    Genitourinary: Negative.    Integumentary:  Negative.   Allergic/Immunologic: Negative.    Neurological:  Positive for headaches.   Psychiatric/Behavioral: Negative.     All other systems reviewed and are negative.          Objective:      Vitals:    02/10/25 0926   BP: (!) 152/80   Pulse:    Resp:    Temp:       Body mass index is 28.29 kg/m².     Physical Exam  Vitals reviewed.   Constitutional:       Appearance: Normal appearance.   HENT:      Head: Normocephalic.      Nose:      Right Turbinates: Not enlarged or swollen.      Left Turbinates: Enlarged and swollen.      Right Sinus: Maxillary sinus tenderness  present.      Left Sinus: Maxillary sinus tenderness present.      Mouth/Throat:      Mouth: Mucous membranes are moist.   Eyes:      Conjunctiva/sclera: Conjunctivae normal.      Pupils: Pupils are equal, round, and reactive to light.   Cardiovascular:      Rate and Rhythm: Normal rate and regular rhythm.   Pulmonary:      Effort: Pulmonary effort is normal. No respiratory distress.      Breath sounds: Normal breath sounds.   Musculoskeletal:         General: Normal range of motion.      Cervical back: Normal range of motion and neck supple.   Skin:     General: Skin is warm and dry.   Neurological:      Mental Status: She is alert and oriented to person, place, and time.   Psychiatric:         Mood and Affect: Mood normal.            Current Outpatient Medications:     amlodipine-benazepril 5-10 mg (LOTREL) 5-10 mg per capsule, Take 1 capsule by mouth once daily., Disp: 90 capsule, Rfl: 3    cholecalciferol, vitamin D3, 1,250 mcg (50,000 unit) Tab, Take 50,000 Units by mouth., Disp: , Rfl:     fluticasone propionate (FLONASE) 50 mcg/actuation nasal spray, 2 SPRAYS (100 MCG TOTAL) BY EACH NOSTRIL ROUTE DAILY., Disp: 48 mL, Rfl: 3    meloxicam (MOBIC) 7.5 MG tablet, TAKE 1 TABLET (7.5 MG TOTAL) BY MOUTH DAILY AS NEEDED FOR PAIN. TAKE WITH FOOD. MED FOR ARTHRITIS., Disp: , Rfl:     triamcinolone acetonide 0.1% (KENALOG) 0.1 % ointment, APPLY TOPICALLY TWICE A DAY, Disp: 454 g, Rfl: 1    anastrozole (ARIMIDEX) 1 mg Tab, Take 1 mg by mouth once daily., Disp: , Rfl:     doxycycline (VIBRAMYCIN) 100 MG Cap, Take 1 capsule (100 mg total) by mouth every 12 (twelve) hours. for 10 days, Disp: 20 capsule, Rfl: 0    traZODone (DESYREL) 50 MG tablet, TAKE 1 TABLET BY MOUTH AT BEDTIME AS NEEDED SLEEP, Disp: 90 tablet, Rfl: 3    Assessment & Plan:     Problem List Items Addressed This Visit          ENT    Sinusitis - Primary     Nasal saline prn  Rx Doxycyline (Allergy to PCN)  ENT can be considered if no improvement  Instructed  to continue to monitor symptoms  Report to ED for any CP, SOB, and/or worsening symptoms  Pt is agreeable to plan and verbalizes understanding             Relevant Medications    doxycycline (VIBRAMYCIN) 100 MG Cap    Bilateral impacted cerumen     Debrox prn  RTC in 2 weeks for cerumen removal              Cardiac/Vascular    Hypertension     BP elevated; Asymptomatic  Did not take BP medications today  Continue Lotrel as prescribed  Instructed to take BP meds prior to all clinic appointments  Instructed to report to ED for any BP >200/100, SOB, CP, and/or worsening symptoms  Daily BP check; bring log to all appointments  RTC in 1-2 weeks for follow up  Verbalizes all understanding

## 2025-02-12 ENCOUNTER — TELEPHONE (OUTPATIENT)
Dept: FAMILY MEDICINE | Facility: CLINIC | Age: 80
End: 2025-02-12
Payer: MEDICARE

## 2025-02-12 DIAGNOSIS — J32.9 SINUSITIS, UNSPECIFIED CHRONICITY, UNSPECIFIED LOCATION: Primary | ICD-10-CM

## 2025-02-12 RX ORDER — AZITHROMYCIN 250 MG/1
TABLET, FILM COATED ORAL
Qty: 6 TABLET | Refills: 0 | Status: SHIPPED | OUTPATIENT
Start: 2025-02-12 | End: 2025-02-17

## 2025-02-12 NOTE — TELEPHONE ENCOUNTER
----- Message from Gela sent at 2/12/2025  9:04 AM CST -----  Who Called: Milla Rider    Caller is requesting assistance/information from provider's office.    Symptoms (please be specific): Itching   How long has patient had these symptoms:   List of preferred pharmacies on file (remove unneeded): [unfilled]  If different, enter pharmacy into here including location and phone number:       Preferred Method of Contact: Phone Call  Patient's Preferred Phone Number on File: [phone]   Best Call Back Number, if different:  Additional Information: Pt called requesting to speak with nurse and possibly getting a alternative for doxycycline (VIBRAMYCIN) 100 MG Cap. Pt states the new medication make her itch really badly and is requesting a change in meds

## 2025-02-24 ENCOUNTER — TELEPHONE (OUTPATIENT)
Dept: FAMILY MEDICINE | Facility: CLINIC | Age: 80
End: 2025-02-24

## 2025-02-24 NOTE — TELEPHONE ENCOUNTER
----- Message from Asael sent at 2/24/2025  8:07 AM CST -----  Who Called: Milla Craig is requesting assistance/information from provider's office.Symptoms (please be specific):  How long has patient had these symptoms:  List of preferred pharmacies on file (remove unneeded): [unfilled]If different, enter pharmacy into here including location and phone number: Patient's Preferred Phone Number on File: [phone] Best Call Back Number, if different:Additional Information: pt was sched for 2 wk fu appt today, pt is unable to attend appt, would like to determine if she should continue taking ear drops until newly scheduled appt 02/26 , (stated appt was to remove ear wax , pt was instructed to use drops until she ses provider )

## 2025-02-26 ENCOUNTER — OFFICE VISIT (OUTPATIENT)
Dept: FAMILY MEDICINE | Facility: CLINIC | Age: 80
End: 2025-02-26
Payer: MEDICARE

## 2025-02-26 VITALS
HEIGHT: 65 IN | RESPIRATION RATE: 18 BRPM | DIASTOLIC BLOOD PRESSURE: 78 MMHG | BODY MASS INDEX: 28.37 KG/M2 | SYSTOLIC BLOOD PRESSURE: 146 MMHG | OXYGEN SATURATION: 95 % | HEART RATE: 68 BPM | TEMPERATURE: 98 F | WEIGHT: 170.31 LBS

## 2025-02-26 DIAGNOSIS — H61.23 BILATERAL IMPACTED CERUMEN: ICD-10-CM

## 2025-02-26 DIAGNOSIS — I10 PRIMARY HYPERTENSION: Primary | ICD-10-CM

## 2025-02-26 NOTE — ASSESSMENT & PLAN NOTE
Pre-Procedural  Verbal consent given  Bilateral ear canal noted impacted with cerumen     Procedure  Bilateral ear canal irrigated with warm water using Rhino-Ear washer  Tolerated well     Post-Procedure  Bilateral TM without any erythema or drainage.

## 2025-02-26 NOTE — PROGRESS NOTES
Subjective:      Patient ID: Milla Rider is a 80 y.o. Black or  female      Chief Complaint: Follow-up (Hypertension)       Past Medical History:   Diagnosis Date    Anemia     Anxiety 6/9/2022    Arthritis     Cancer     Gastroesophageal reflux disease 6/13/2023    Hypertension     Insomnia     Iron deficiency anemia 11/6/2020    Osteoporosis         HPI  Presents today for re-evaluation of BP and cerumen removal.    HTN:  BP elevated at previous visit.  No changes made.  Currently taking Lotrel 5-10 mg po daily.  Of note, Lotrel has been decreased in the past s/t hypotension.  BP elevated today; states 's at home.  States compliance with medications.  Denies any headaches, dizziness, syncope, CP, or SOB.  Denies any other problems.    Cerumen Impaction:  Here for cerumen removal.       Follow-up  Pertinent negatives include no chest pain, chills or fever.       Patient Care Team:  Jennifer Oliveira DO as PCP - General (Family Medicine)      Review of Systems   Constitutional: Negative.  Negative for appetite change, chills and fever.   HENT: Negative.     Eyes: Negative.  Negative for discharge and redness.   Respiratory: Negative.  Negative for shortness of breath.    Cardiovascular: Negative.  Negative for chest pain.   Gastrointestinal: Negative.    Endocrine: Negative.    Genitourinary: Negative.    Integumentary:  Negative.   Allergic/Immunologic: Negative.    Neurological: Negative.    Psychiatric/Behavioral: Negative.     All other systems reviewed and are negative.          Objective:      Vitals:    02/26/25 1029   BP: (!) 146/78   Pulse:    Resp:    Temp:       Body mass index is 28.34 kg/m².     Physical Exam  Vitals reviewed.   Constitutional:       Appearance: Normal appearance.   HENT:      Head: Normocephalic.      Right Ear: There is impacted cerumen.      Left Ear: There is impacted cerumen.      Mouth/Throat:      Mouth: Mucous membranes are moist.   Eyes:       Conjunctiva/sclera: Conjunctivae normal.      Pupils: Pupils are equal, round, and reactive to light.   Cardiovascular:      Rate and Rhythm: Normal rate and regular rhythm.   Pulmonary:      Effort: Pulmonary effort is normal. No respiratory distress.      Breath sounds: Normal breath sounds.   Musculoskeletal:         General: Normal range of motion.      Cervical back: Normal range of motion and neck supple.   Skin:     General: Skin is warm and dry.   Neurological:      Mental Status: She is alert and oriented to person, place, and time.   Psychiatric:         Mood and Affect: Mood normal.          Current Medications[1]    Assessment & Plan:     Problem List Items Addressed This Visit          ENT    Bilateral impacted cerumen    Pre-Procedural  Verbal consent given  Bilateral ear canal noted impacted with cerumen     Procedure  Bilateral ear canal irrigated with warm water using Rhino-Ear washer  Tolerated well     Post-Procedure  Bilateral TM without any erythema or drainage.               Cardiac/Vascular    Hypertension - Primary    BP elevated; Asymptomatic; States 's at home  Currently taking Lotrel 5-10 mg po daily.   Of note, Lotrel has been decreased in the past s/t hypotension.   Pt will like to monitor BP at present; continue Lotrel at current dose  Instructed to take BP meds prior to all clinic appointments  Instructed to report to ED for any BP >200/100, SOB, CP, and/or worsening symptoms  Daily BP check; bring log to all appointments  Keep appt with PCP next month for reevaluation  Verbalizes all understanding                              [1]   Current Outpatient Medications:     amlodipine-benazepril 5-10 mg (LOTREL) 5-10 mg per capsule, Take 1 capsule by mouth once daily., Disp: 90 capsule, Rfl: 3    cholecalciferol, vitamin D3, 1,250 mcg (50,000 unit) Tab, Take 50,000 Units by mouth., Disp: , Rfl:     fluticasone propionate (FLONASE) 50 mcg/actuation nasal spray, 2 SPRAYS (100 MCG TOTAL)  BY EACH NOSTRIL ROUTE DAILY., Disp: 48 mL, Rfl: 3    meloxicam (MOBIC) 7.5 MG tablet, TAKE 1 TABLET (7.5 MG TOTAL) BY MOUTH DAILY AS NEEDED FOR PAIN. TAKE WITH FOOD. MED FOR ARTHRITIS., Disp: , Rfl:     triamcinolone acetonide 0.1% (KENALOG) 0.1 % ointment, APPLY TOPICALLY TWICE A DAY, Disp: 454 g, Rfl: 1

## 2025-02-26 NOTE — ASSESSMENT & PLAN NOTE
BP elevated; Asymptomatic; States 's at home  Currently taking Lotrel 5-10 mg po daily.   Of note, Lotrel has been decreased in the past s/t hypotension.   Pt will like to monitor BP at present; continue Lotrel at current dose  Instructed to take BP meds prior to all clinic appointments  Instructed to report to ED for any BP >200/100, SOB, CP, and/or worsening symptoms  Daily BP check; bring log to all appointments  Keep appt with PCP next month for reevaluation  Verbalizes all understanding

## 2025-03-24 ENCOUNTER — TELEPHONE (OUTPATIENT)
Dept: FAMILY MEDICINE | Facility: CLINIC | Age: 80
End: 2025-03-24
Payer: MEDICARE

## 2025-03-24 DIAGNOSIS — F51.01 PRIMARY INSOMNIA: ICD-10-CM

## 2025-03-24 DIAGNOSIS — R73.9 HYPERGLYCEMIA: ICD-10-CM

## 2025-03-24 DIAGNOSIS — Z00.00 MEDICARE ANNUAL WELLNESS VISIT, SUBSEQUENT: Primary | ICD-10-CM

## 2025-03-24 DIAGNOSIS — D50.9 IRON DEFICIENCY ANEMIA, UNSPECIFIED IRON DEFICIENCY ANEMIA TYPE: ICD-10-CM

## 2025-03-24 DIAGNOSIS — F41.9 ANXIETY: ICD-10-CM

## 2025-03-24 DIAGNOSIS — Z13.220 NEED FOR LIPID SCREENING: ICD-10-CM

## 2025-03-24 DIAGNOSIS — E78.5 HYPERLIPIDEMIA, UNSPECIFIED HYPERLIPIDEMIA TYPE: ICD-10-CM

## 2025-03-24 DIAGNOSIS — I10 PRIMARY HYPERTENSION: ICD-10-CM

## 2025-03-24 NOTE — TELEPHONE ENCOUNTER
----- Message from Med Assistant Mccarthy sent at 3/24/2025  4:29 PM CDT -----  Regarding: FW: labs  Please advise.  ----- Message -----  From: Stefanie Santiago  Sent: 3/24/2025   3:28 PM CDT  To: Roxanne Rodriguez Staff  Subject: labs                                             .Caller is requesting to schedule their Lab appointment prior to annual appointment.Name of Caller:ptPreferred Date and Time of Labs:Date of EPP Appointment:3/27Where would they like the lab performed?n/aWould the patient rather a call back or a response via My [facility]? [facility] Call Back Number: [phone] Information: advise pt when labs are added

## 2025-03-24 NOTE — TELEPHONE ENCOUNTER
----- Message from Levon sent at 3/21/2025 10:43 AM CDT -----  .Who Called: Milla Rossi order is the patient requesting: wellness labs  When does the expect the orders to be performed? Asap she has an appt on 3/27/25Preferred Method of Contact: Phone CallPatient's Preferred Phone Number on File: [phone] Best Call Back Number, if different:Additional Information:

## 2025-03-24 NOTE — TELEPHONE ENCOUNTER
I have ordered the following labs. Please notify the patient.    Orders Placed This Encounter   Procedures    CBC Auto Differential     Standing Status:   Future     Expected Date:   3/24/2025     Expiration Date:   6/24/2025    Comprehensive Metabolic Panel     Standing Status:   Future     Expected Date:   3/24/2025     Expiration Date:   6/24/2025    Hemoglobin A1C     Standing Status:   Future     Expected Date:   3/24/2025     Expiration Date:   6/24/2025    Lipid Panel     Standing Status:   Future     Expected Date:   3/24/2025     Expiration Date:   6/24/2025    TSH     Standing Status:   Future     Expected Date:   3/24/2025     Expiration Date:   6/24/2025

## 2025-03-24 NOTE — TELEPHONE ENCOUNTER
I called patient and no answer. Left message for patient that fasting lab orders were placed. Alphonso

## 2025-03-26 ENCOUNTER — LAB VISIT (OUTPATIENT)
Dept: LAB | Facility: HOSPITAL | Age: 80
End: 2025-03-26
Attending: STUDENT IN AN ORGANIZED HEALTH CARE EDUCATION/TRAINING PROGRAM
Payer: MEDICARE

## 2025-03-26 DIAGNOSIS — F41.9 ANXIETY: ICD-10-CM

## 2025-03-26 DIAGNOSIS — Z00.00 MEDICARE ANNUAL WELLNESS VISIT, SUBSEQUENT: ICD-10-CM

## 2025-03-26 DIAGNOSIS — Z13.220 NEED FOR LIPID SCREENING: ICD-10-CM

## 2025-03-26 DIAGNOSIS — D50.9 IRON DEFICIENCY ANEMIA, UNSPECIFIED IRON DEFICIENCY ANEMIA TYPE: ICD-10-CM

## 2025-03-26 DIAGNOSIS — R73.9 HYPERGLYCEMIA: ICD-10-CM

## 2025-03-26 DIAGNOSIS — E78.5 HYPERLIPIDEMIA, UNSPECIFIED HYPERLIPIDEMIA TYPE: ICD-10-CM

## 2025-03-26 DIAGNOSIS — I10 PRIMARY HYPERTENSION: ICD-10-CM

## 2025-03-26 LAB
ALBUMIN SERPL-MCNC: 3.8 G/DL (ref 3.4–4.8)
ALBUMIN/GLOB SERPL: 0.9 RATIO (ref 1.1–2)
ALP SERPL-CCNC: 82 UNIT/L (ref 40–150)
ALT SERPL-CCNC: 14 UNIT/L (ref 0–55)
ANION GAP SERPL CALC-SCNC: 8 MEQ/L
AST SERPL-CCNC: 21 UNIT/L (ref 11–45)
BASOPHILS # BLD AUTO: 0.07 X10(3)/MCL
BASOPHILS NFR BLD AUTO: 0.9 %
BILIRUB SERPL-MCNC: 0.5 MG/DL
BUN SERPL-MCNC: 15.7 MG/DL (ref 9.8–20.1)
CALCIUM SERPL-MCNC: 9.6 MG/DL (ref 8.4–10.2)
CHLORIDE SERPL-SCNC: 105 MMOL/L (ref 98–107)
CHOLEST SERPL-MCNC: 189 MG/DL
CHOLEST/HDLC SERPL: 3 {RATIO} (ref 0–5)
CO2 SERPL-SCNC: 28 MMOL/L (ref 23–31)
CREAT SERPL-MCNC: 0.76 MG/DL (ref 0.55–1.02)
CREAT/UREA NIT SERPL: 21
EOSINOPHIL # BLD AUTO: 0.28 X10(3)/MCL (ref 0–0.9)
EOSINOPHIL NFR BLD AUTO: 3.8 %
ERYTHROCYTE [DISTWIDTH] IN BLOOD BY AUTOMATED COUNT: 13.3 % (ref 11.5–17)
EST. AVERAGE GLUCOSE BLD GHB EST-MCNC: 105.4 MG/DL
GFR SERPLBLD CREATININE-BSD FMLA CKD-EPI: >60 ML/MIN/1.73/M2
GLOBULIN SER-MCNC: 4.4 GM/DL (ref 2.4–3.5)
GLUCOSE SERPL-MCNC: 95 MG/DL (ref 82–115)
HBA1C MFR BLD: 5.3 %
HCT VFR BLD AUTO: 47.4 % (ref 37–47)
HDLC SERPL-MCNC: 58 MG/DL (ref 35–60)
HGB BLD-MCNC: 14.7 G/DL (ref 12–16)
IMM GRANULOCYTES # BLD AUTO: 0.03 X10(3)/MCL (ref 0–0.04)
IMM GRANULOCYTES NFR BLD AUTO: 0.4 %
LDLC SERPL CALC-MCNC: 109 MG/DL (ref 50–140)
LYMPHOCYTES # BLD AUTO: 0.78 X10(3)/MCL (ref 0.6–4.6)
LYMPHOCYTES NFR BLD AUTO: 10.5 %
MCH RBC QN AUTO: 28.4 PG (ref 27–31)
MCHC RBC AUTO-ENTMCNC: 31 G/DL (ref 33–36)
MCV RBC AUTO: 91.7 FL (ref 80–94)
MONOCYTES # BLD AUTO: 0.57 X10(3)/MCL (ref 0.1–1.3)
MONOCYTES NFR BLD AUTO: 7.7 %
NEUTROPHILS # BLD AUTO: 5.69 X10(3)/MCL (ref 2.1–9.2)
NEUTROPHILS NFR BLD AUTO: 76.7 %
NRBC BLD AUTO-RTO: 0 %
PLATELET # BLD AUTO: 334 X10(3)/MCL (ref 130–400)
PMV BLD AUTO: 8.6 FL (ref 7.4–10.4)
POTASSIUM SERPL-SCNC: 4 MMOL/L (ref 3.5–5.1)
PROT SERPL-MCNC: 8.2 GM/DL (ref 5.8–7.6)
RBC # BLD AUTO: 5.17 X10(6)/MCL (ref 4.2–5.4)
SODIUM SERPL-SCNC: 141 MMOL/L (ref 136–145)
TRIGL SERPL-MCNC: 110 MG/DL (ref 37–140)
TSH SERPL-ACNC: 1.52 UIU/ML (ref 0.35–4.94)
VLDLC SERPL CALC-MCNC: 22 MG/DL
WBC # BLD AUTO: 7.42 X10(3)/MCL (ref 4.5–11.5)

## 2025-03-26 PROCEDURE — 80061 LIPID PANEL: CPT

## 2025-03-26 PROCEDURE — 83036 HEMOGLOBIN GLYCOSYLATED A1C: CPT

## 2025-03-26 PROCEDURE — 84443 ASSAY THYROID STIM HORMONE: CPT

## 2025-03-26 PROCEDURE — 80053 COMPREHEN METABOLIC PANEL: CPT

## 2025-03-26 PROCEDURE — 85025 COMPLETE CBC W/AUTO DIFF WBC: CPT

## 2025-03-26 PROCEDURE — 36415 COLL VENOUS BLD VENIPUNCTURE: CPT

## 2025-03-27 ENCOUNTER — RESULTS FOLLOW-UP (OUTPATIENT)
Dept: FAMILY MEDICINE | Facility: CLINIC | Age: 80
End: 2025-03-27

## 2025-04-08 ENCOUNTER — OFFICE VISIT (OUTPATIENT)
Dept: FAMILY MEDICINE | Facility: CLINIC | Age: 80
End: 2025-04-08
Payer: MEDICARE

## 2025-04-08 VITALS
RESPIRATION RATE: 18 BRPM | TEMPERATURE: 98 F | WEIGHT: 168.38 LBS | BODY MASS INDEX: 28.06 KG/M2 | SYSTOLIC BLOOD PRESSURE: 138 MMHG | DIASTOLIC BLOOD PRESSURE: 74 MMHG | OXYGEN SATURATION: 99 % | HEIGHT: 65 IN | HEART RATE: 74 BPM

## 2025-04-08 DIAGNOSIS — Z00.00 WELLNESS EXAMINATION: Primary | ICD-10-CM

## 2025-04-08 DIAGNOSIS — F51.01 PRIMARY INSOMNIA: ICD-10-CM

## 2025-04-08 DIAGNOSIS — M81.0 OSTEOPOROSIS, UNSPECIFIED OSTEOPOROSIS TYPE, UNSPECIFIED PATHOLOGICAL FRACTURE PRESENCE: ICD-10-CM

## 2025-04-08 DIAGNOSIS — I10 PRIMARY HYPERTENSION: ICD-10-CM

## 2025-04-08 DIAGNOSIS — J30.89 NON-SEASONAL ALLERGIC RHINITIS, UNSPECIFIED TRIGGER: ICD-10-CM

## 2025-04-08 DIAGNOSIS — C50.911 MUCINOUS CARCINOMA OF RIGHT BREAST: ICD-10-CM

## 2025-04-08 DIAGNOSIS — Z85.3 HISTORY OF BREAST CANCER: ICD-10-CM

## 2025-04-08 DIAGNOSIS — K21.9 GASTROESOPHAGEAL REFLUX DISEASE, UNSPECIFIED WHETHER ESOPHAGITIS PRESENT: ICD-10-CM

## 2025-04-08 PROBLEM — F41.9 ANXIETY: Status: RESOLVED | Noted: 2022-06-09 | Resolved: 2025-04-08

## 2025-04-08 PROBLEM — H61.23 BILATERAL IMPACTED CERUMEN: Status: RESOLVED | Noted: 2025-02-10 | Resolved: 2025-04-08

## 2025-04-08 PROBLEM — L29.9 PRURITUS: Status: RESOLVED | Noted: 2022-06-09 | Resolved: 2025-04-08

## 2025-04-08 PROBLEM — J32.9 SINUSITIS: Status: RESOLVED | Noted: 2025-02-10 | Resolved: 2025-04-08

## 2025-04-08 RX ORDER — ZOLPIDEM TARTRATE 5 MG/1
5 TABLET ORAL NIGHTLY PRN
Qty: 30 TABLET | Refills: 1 | Status: SHIPPED | OUTPATIENT
Start: 2025-04-08 | End: 2025-10-07

## 2025-04-08 NOTE — PROGRESS NOTES
Family Medicine      Patient ID: [id number]     Chief Complaint: Medicare Annual Wellness     HPI:     Milla Rider is a 80 y.o. female here today for a Medicare Annual Wellness visit and comprehensive Health Risk Assessment.         Health Maintenance         Date Due Completion Date    TETANUS VACCINE Never done ---    Shingles Vaccine (1 of 2) Never done ---    RSV Vaccine (Age 60+ and Pregnant patients) (1 - 1-dose 75+ series) Never done ---    COVID-19 Vaccine (7 - 2024-25 season) 09/01/2024 12/2/2021    DEXA Scan 02/09/2027 2/9/2024    Lipid Panel 03/26/2030 3/26/2025        Labs previously done and reviewed with patient  States she will consider vaccines at a later date      Past Medical History:   Diagnosis Date    Anemia     Anxiety 6/9/2022    Arthritis     Cancer     Gastroesophageal reflux disease 6/13/2023    Hypertension     Insomnia     Iron deficiency anemia 11/6/2020    Osteoporosis         Past Surgical History:   Procedure Laterality Date    BREAST SURGERY      CATARACT EXTRACTION EXTRACAPSULAR W/ INTRAOCULAR LENS IMPLANTATION Bilateral     COLONOSCOPY      DILATION AND CURETTAGE OF UTERUS      HYSTERECTOMY      LYMPH NODE DISSECTION          Social History     Socioeconomic History    Marital status:    Occupational History    Occupation: retired   Tobacco Use    Smoking status: Never    Smokeless tobacco: Never   Substance and Sexual Activity    Alcohol use: Never    Drug use: Never    Sexual activity: Not Currently     Partners: Male     Social Drivers of Health     Financial Resource Strain: Medium Risk (1/26/2024)    Overall Financial Resource Strain (CARDIA)     Difficulty of Paying Living Expenses: Somewhat hard   Food Insecurity: Food Insecurity Present (1/26/2024)    Hunger Vital Sign     Worried About Running Out of Food in the Last Year: Never true     Ran Out of Food in the Last Year: Sometimes true   Transportation Needs: Unmet Transportation Needs (1/26/2024)    [place]AN[place]  - Transportation     Lack of Transportation (Medical): Yes     Lack of Transportation (Non-Medical): Yes   Physical Activity: Unknown (1/26/2024)    Exercise Vital Sign     Days of Exercise per Week: 0 days   Recent Concern: Physical Activity - Inactive (1/26/2024)    Exercise Vital Sign     Days of Exercise per Week: 0 days     Minutes of Exercise per Session: 10 min   Stress: Stress Concern Present (1/26/2024)    [employer] - Occupational Stress Questionnaire     Feeling of Stress : To some extent   Housing Stability: Low Risk  (1/26/2024)    Housing Stability Vital Sign     Unable to Pay for Housing in the Last Year: No     Number of Places Lived in the Last Year: 1     Unstable Housing in the Last Year: No        Family History   Problem Relation Name Age of Onset    Heart disease Sister      Hypertension Sister          Current Outpatient Medications   Medication Instructions    amlodipine-benazepril 5-10 mg (LOTREL) 5-10 mg per capsule 1 capsule, Oral, Daily    cholecalciferol (vitamin D3) 50,000 Units    fluticasone propionate (FLONASE) 100 mcg, Each Nostril, Daily    meloxicam (MOBIC) 7.5 MG tablet TAKE 1 TABLET (7.5 MG TOTAL) BY MOUTH DAILY AS NEEDED FOR PAIN. TAKE WITH FOOD. MED FOR ARTHRITIS.    triamcinolone acetonide 0.1% (KENALOG) 0.1 % ointment 1 application , 2 times daily, Apply to affected area    zolpidem (AMBIEN) 5 mg, Oral, Nightly PRN       Review of patient's allergies indicates:   Allergen Reactions    Hydroxyzine Palpitations     Atarax    Penicillin Hives     Other reaction(s): No Steroids unless approved by attending physician  As a teenager, had coughing and itching, rash.      However, states that she has taken amoxicillin since then without adverse reaction     Cortisone      Other reaction(s): hypertension  Other reaction(s): hypertension  Other reaction(s): hypertension    Sulfamethoxazole      Bactrim  Other reaction(s): heartburn, nausea     "    Immunization History   Administered Date(s) Administered    COVID-19 MRNA, LN-S PF (MODERNA HALF 0.25 ML DOSE) 12/02/2021    COVID-19 Vaccine 04/26/2021, 05/24/2021, 12/02/2021    COVID-19, MRNA, LN-S, PF (MODERNA FULL 0.5 ML DOSE) 04/26/2021, 05/24/2021    Influenza (FLUAD) - Quadrivalent - Adjuvanted - PF *Preferred* (65+) 11/03/2022, 01/29/2024    Influenza - Quadrivalent - PF *Preferred* (6 months and older) 12/12/2012    Influenza - Trivalent - Fluad - Adjuvanted - PF (65 years and older 11/05/2024    Influenza - Trivalent - Fluarix, Flulaval, Fluzone, Afluria - PF 12/12/2012    Pneumococcal Conjugate - 20 Valent 12/05/2022        Patient Care Team:  Jennifer Oliveira DO as PCP - General (Family Medicine)    Subjective:     Review of Systems   Constitutional: Negative.         Insomnia   HENT: Negative.     Eyes: Negative.    Respiratory: Negative.     Cardiovascular: Negative.    Gastrointestinal: Negative.    Endocrine: Negative.    Genitourinary: Negative.    Musculoskeletal: Negative.    Skin: Negative.    Neurological: Negative.    Psychiatric/Behavioral: Negative.     All other systems reviewed and are negative.      12 point review of systems conducted, negative except as stated in the history of present illness. See HPI for details.    Objective:     Visit Vitals  /74 (BP Location: Left arm, Patient Position: Sitting)   Pulse 74   Temp 97.8 °F (36.6 °C) (Oral)   Resp 18   Ht 5' 5" (1.651 m)   Wt 76.4 kg (168 lb 6.4 oz)   SpO2 99%   BMI 28.02 kg/m²       Physical Exam  Vitals reviewed.   Constitutional:       Appearance: Normal appearance.   HENT:      Head: Normocephalic.      Mouth/Throat:      Mouth: Mucous membranes are moist.   Eyes:      Conjunctiva/sclera: Conjunctivae normal.      Pupils: Pupils are equal, round, and reactive to light.   Cardiovascular:      Rate and Rhythm: Normal rate and regular rhythm.   Pulmonary:      Effort: Pulmonary effort is normal. No respiratory distress.      " Breath sounds: Normal breath sounds.   Abdominal:      General: There is no distension.      Palpations: Abdomen is soft.      Tenderness: There is no abdominal tenderness.   Musculoskeletal:         General: Normal range of motion.      Cervical back: Normal range of motion and neck supple.   Skin:     General: Skin is warm and dry.   Neurological:      Mental Status: She is alert and oriented to person, place, and time.   Psychiatric:         Mood and Affect: Mood normal.         Assessment:       ICD-10-CM ICD-9-CM   1. Wellness examination  Z00.00 V70.0   2. Primary hypertension  I10 401.9   3. Osteoporosis, unspecified osteoporosis type, unspecified pathological fracture presence  M81.0 733.00   4. Primary insomnia  F51.01 307.42   5. Gastroesophageal reflux disease, unspecified whether esophagitis present  K21.9 530.81   6. Non-seasonal allergic rhinitis, unspecified trigger  J30.89 477.8   7. 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 Wellness examination  Assessment & Plan:  Labs previously done and reviewed with patient  States she will consider vaccines at a later date      2. Primary hypertension  Assessment & Plan:  BP at goal  Continue current medication (Lotrel) as prescribed  Daily BP check; bring log all clinic visits  Instructed to report to ED for any BP greater than 200/100, dizziness, syncope, CP, or SOB  Keep appt. With PCP for follow up  Patient is agreeable to plan and verbalizes understanding        3. Osteoporosis, unspecified osteoporosis type, unspecified pathological fracture presence  Assessment & Plan:  Patient following with Dr. Tiffanie Piper with Endocrinology in [place], [place].  - Patient taking calcium + vitamin D supplementation daily.       4. Primary insomnia  Overview:  Unable to take Hydroxyzine and Remeron s/t palpitations and lightheadedness   No relief with Trazodone    Assessment & Plan:  Unable to take  Hydroxyzine and Remeron s/t palpitations and lightheadedness   No relief with Trazodone at bedtime prn sleep  Pt states improvement with Ambien and is requesting Ambien prn; Rx sent;  reviewed  F/U if no improvement  Keep appt with PCP for follow up    Orders:  -     zolpidem (AMBIEN) 5 MG Tab; Take 1 tablet (5 mg total) by mouth nightly as needed (sleep).  Dispense: 30 tablet; Refill: 1    5. Gastroesophageal reflux disease, unspecified whether esophagitis present  Assessment & Plan:  Stable  Keep appt with PCP for follow up        6. Non-seasonal allergic rhinitis, unspecified trigger  Assessment & Plan:  Stable  Continue Flonase and Singulair  Keep appt with PCP for follow up        7. History of breast cancer  Assessment & Plan:  No longer taking Anastrazole; last dose 1/2025 per MD De La Torre  Keep appt with Oncology with MD De La Torre       8. Mucinous carcinoma of right breast  Overview:  No longer taking Anastrazole; last dose 1/2025 per MD De La Torre           A comprehensive HEALTH RISK ASSESSMENT was completed today. Results are summarized below:    There are NO EMOTIONAL/SOCIAL CONCERNS identified on today's screening for Social Isolation, Depression and Anxiety.    There are NO COGNITIVE FUNCTION CONCERNS identified on today's screening.  The following FUNCTIONAL AND/OR SAFETY CONCERNS were identified on today's screening for Physical Symptoms, Nutritional, Home Safety/Living Situation, Fall Risk, Activities of Daily Living, Independent Activities of Daily Living, Physical Activity,Timed Up and Go test and Whisper test::  *Patient reports NO ROUTINE EXERCISE. (On average, how many days per week do you engage in moderate to strenuous exercise (like a brisk walk)?: (!) 0)  *Patient reports PHYSICAL ACTIVITY LIMITED BY PAIN/FATIGUE. (In the past four weeks have you your activities been limited by pain, tiredness or fatigue?: (!) Yes)      The patient reports NO OPIOID PRESCRIPTIONS. This was confirmed through  medication reconciliation and the [facility] website.    The patient is NOT A TOBACCO USER.        All Questions regarding food, transportation or housing were not answered today.    The patient was asked and declined the use of a free .    Advance Care Planning   Today we discussed advance care planning. She is interested in learning more about how to make Advance Directives. Information was provided and I offered to discuss more at her discretion.         Provided patient with a 5-10 year written screening schedule and personal prevention plan. Recommendations were developed using the USPSTF age appropriate recommendations. Education, counseling, and referrals were provided as needed. After Visit Summary printed and given to patient, which includes a list of additional screenings\tests needed.    Follow up in about 1 year (around 4/8/2026), or if symptoms worsen or fail to improve, for Medicare AWV, F/U with PCP. In addition to their scheduled follow up, the patient has also been instructed to follow up on as needed basis.     Future Appointments   Date Time Provider Department Center   7/9/2025  9:30 AM Jennifer Oliveira DO [facility] [place]   4/9/2026  9:30 AM Jennifer Oliveira, DO [facility] NP   Advance Care Planning   Today we discussed advance care planning. She is interested in learning more about how to make Advance Directives. Information was provided and I offered to discuss more at her discretion.

## 2025-04-08 NOTE — ASSESSMENT & PLAN NOTE
Patient following with Dr. Tiffanie Piper with Endocrinology in [place], [place].  - Patient taking calcium + vitamin D supplementation daily.

## 2025-04-08 NOTE — ASSESSMENT & PLAN NOTE
BP at goal  Continue current medication (Lotrel) as prescribed  Daily BP check; bring log all clinic visits  Instructed to report to ED for any BP greater than 200/100, dizziness, syncope, CP, or SOB  Keep appt. With PCP for follow up  Patient is agreeable to plan and verbalizes understanding

## 2025-04-08 NOTE — ASSESSMENT & PLAN NOTE
No longer taking Anastrazole; last dose 1/2025 per MD De La Torre  Keep appt with Oncology with MD De La Torre

## 2025-05-27 DIAGNOSIS — J30.9 ALLERGIC RHINITIS, UNSPECIFIED SEASONALITY, UNSPECIFIED TRIGGER: ICD-10-CM

## 2025-05-27 RX ORDER — FLUTICASONE PROPIONATE 50 MCG
2 SPRAY, SUSPENSION (ML) NASAL DAILY
Qty: 48 ML | Refills: 3 | Status: SHIPPED | OUTPATIENT
Start: 2025-05-27

## 2025-07-24 ENCOUNTER — TELEPHONE (OUTPATIENT)
Dept: FAMILY MEDICINE | Facility: CLINIC | Age: 80
End: 2025-07-24
Payer: MEDICARE